# Patient Record
Sex: MALE | Race: WHITE
[De-identification: names, ages, dates, MRNs, and addresses within clinical notes are randomized per-mention and may not be internally consistent; named-entity substitution may affect disease eponyms.]

---

## 2020-04-12 ENCOUNTER — HOSPITAL ENCOUNTER (EMERGENCY)
Dept: HOSPITAL 88 - ER | Age: 50
Discharge: OTHER INSTITUTION | End: 2020-04-12
Payer: COMMERCIAL

## 2020-04-12 VITALS — SYSTOLIC BLOOD PRESSURE: 136 MMHG | DIASTOLIC BLOOD PRESSURE: 68 MMHG

## 2020-04-12 VITALS — WEIGHT: 315 LBS | HEIGHT: 72 IN | BODY MASS INDEX: 42.66 KG/M2

## 2020-04-12 DIAGNOSIS — E66.01: ICD-10-CM

## 2020-04-12 DIAGNOSIS — H53.131: Primary | ICD-10-CM

## 2020-04-12 LAB
ALBUMIN SERPL-MCNC: 3.4 G/DL (ref 3.5–5)
ALBUMIN/GLOB SERPL: 0.9 {RATIO} (ref 0.8–2)
ALP SERPL-CCNC: 77 IU/L (ref 40–150)
ALT SERPL-CCNC: 25 IU/L (ref 0–55)
ANION GAP SERPL CALC-SCNC: 11.4 MMOL/L (ref 8–16)
BASOPHILS # BLD AUTO: 0.1 10*3/UL (ref 0–0.1)
BASOPHILS NFR BLD AUTO: 0.5 % (ref 0–1)
BUN SERPL-MCNC: 12 MG/DL (ref 7–26)
BUN/CREAT SERPL: 11 (ref 6–25)
CALCIUM SERPL-MCNC: 8.8 MG/DL (ref 8.4–10.2)
CHLORIDE SERPL-SCNC: 109 MMOL/L (ref 98–107)
CO2 SERPL-SCNC: 25 MMOL/L (ref 22–29)
DEPRECATED NEUTROPHILS # BLD AUTO: 7.4 10*3/UL (ref 2.1–6.9)
EGFRCR SERPLBLD CKD-EPI 2021: > 60 ML/MIN (ref 60–?)
EOSINOPHIL # BLD AUTO: 0.1 10*3/UL (ref 0–0.4)
EOSINOPHIL NFR BLD AUTO: 0.6 % (ref 0–6)
ERYTHROCYTE [DISTWIDTH] IN CORD BLOOD: 14.4 % (ref 11.7–14.4)
GLOBULIN PLAS-MCNC: 3.7 G/DL (ref 2.3–3.5)
GLUCOSE SERPLBLD-MCNC: 122 MG/DL (ref 74–118)
HCT VFR BLD AUTO: 40.4 % (ref 38.2–49.6)
HGB BLD-MCNC: 13.1 G/DL (ref 14–18)
LYMPHOCYTES # BLD: 0.9 10*3/UL (ref 1–3.2)
LYMPHOCYTES NFR BLD AUTO: 10 % (ref 18–39.1)
MCH RBC QN AUTO: 29.4 PG (ref 28–32)
MCHC RBC AUTO-ENTMCNC: 32.4 G/DL (ref 31–35)
MCV RBC AUTO: 90.8 FL (ref 81–99)
MONOCYTES # BLD AUTO: 0.8 10*3/UL (ref 0.2–0.8)
MONOCYTES NFR BLD AUTO: 8.3 % (ref 4.4–11.3)
NEUTS SEG NFR BLD AUTO: 80.1 % (ref 38.7–80)
PLATELET # BLD AUTO: 193 X10E3/UL (ref 140–360)
POTASSIUM SERPL-SCNC: 4.4 MMOL/L (ref 3.5–5.1)
RBC # BLD AUTO: 4.45 X10E6/UL (ref 4.3–5.7)
SODIUM SERPL-SCNC: 141 MMOL/L (ref 136–145)

## 2020-04-12 PROCEDURE — 70496 CT ANGIOGRAPHY HEAD: CPT

## 2020-04-12 PROCEDURE — 85025 COMPLETE CBC W/AUTO DIFF WBC: CPT

## 2020-04-12 PROCEDURE — 36415 COLL VENOUS BLD VENIPUNCTURE: CPT

## 2020-04-12 PROCEDURE — 99284 EMERGENCY DEPT VISIT MOD MDM: CPT

## 2020-04-12 PROCEDURE — 80053 COMPREHEN METABOLIC PANEL: CPT

## 2020-04-12 PROCEDURE — 70498 CT ANGIOGRAPHY NECK: CPT

## 2020-04-12 NOTE — DIAGNOSTIC IMAGING REPORT
History:Vision loss, 

Comparison studies:None



Technique: 

Axial images were obtained from the thoracic inlet.

3-D reconstructions and maximum intensity projection reformats were performed.

Coronal and sagittal images reconstructed from the axial data.

Intravenous contrast: 100 cc of Omnipaque 300.



Dose modulation, iterative reconstruction, and/or weight based adjustment of

the mA/kV was utilized to reduce the radiation dose to as low as reasonably

achievable.



Findings:



Beam hardening artifact due to patient body habitus and suboptimal bolus timing

limits evaluation at the base of the neck.



Percentage of stenosis will be based on the NASCET criteria



Aortic arch and major vessels:

Patent. No abnormalities.



Common carotid arteries:

Patent. No abnormalities.



Right internal carotid artery:

Patent. No acute abnormalities. Retropharyngeal course.



Left internal carotid artery:

Patent. No abnormalities.



Right vertebral artery:

Patent. No abnormalities.



Left vertebral artery:

Patent. No abnormalities.



Basilar artery:

Patent. No abnormalities.



Posterior cerebral arteries:

Patent. No abnormalities.



Anatomical variants:

Acom: Patent .

Pcoms: Not visualized.

Vertebral arteries: Col-dominant



Degenerative moderate canal stenosis at C5-6 secondary to asymmetric right disc

osteophyte complex.



IMPRESSION:



Cervical CTA:

1.  No acute vascular abnormality



Intracranial CTA:

1.  No vascular abnormality



Signed by: DR Armin Jacinto M.D. on 4/12/2020 1:08 PM

## 2020-04-12 NOTE — XMS REPORT
Patient Summary Document

                             Created on: 2020



SHI LA

External Reference #: [id number]

: 1970

Sex: Male



Demographics







                          Address                   [address] [id number]

 

                          Home Phone                [phone]

 

                          Preferred Language        Unknown

 

                          Marital Status            Unknown

 

                          Ep[employer] Affiliation     Unknown

 

                          Race                      Unknown

 

                          Ethnic Group              Unknown





Author







                          Author                    [facility]

 

                          Address                   Unknown

 

                          Phone                     Unavailable







Care Team Providers







                    Care Team Member Name    Role                Phone

 

                    ANGELIQUE FIORE       Unavailable         Unavailable







Problems

This patient has no known problems.



Allergies, Adverse Reactions, Alerts

This patient has no known allergies or adverse reactions.



Medications

This patient has no known medications.



Results







           Test Description    Test Time    Test Comments    Text Results    Atomic Results    Result

 Comments

 

                CT BRAIN WO                                         [address] [address]      Patient Name: SHI LA   MR 
#: [id number]    : 1970 Age/Sex: 46/M  Acct #: [id number] Req #: [id number]
[id number]  Adm Physician:     Ordered by: TEODORO MORRISON  Report #: [id number] 
 Location: ER  Room/Bed:     
___________________________________________________________________________
________________________    Procedure: [id number] CT/CT BRAIN WO  Exam Date: 
10/12/17                            Exam Time: 1155       REPORT STATUS: Signed 
  Exam: Head CT without contrast   History:  Dizziness   Comparison studies:  
2008.      Technique:   Axial images were obtained from the skull base to 
the vertex.   Coronal and sagittal images reconstructed from the axial data.   
Intravenous contrast: None      Findings:      Scalp: No abnormalities.   Bones:
No fractures, blastic or lytic lesions.      Brain sulci: Appropriate for age.  
Ventricles: Normal in size. No hydrocephalus. Incidental punctate hyperdensity  
regional to the foramen of Monro may represent incidental choroidal   
calcification or small colloid cyst and is unchanged.      Extra-axial spaces: 
No masses, no fluid collection.       Parenchyma:    No abnormal densities.    
No masses, acute hemorrhage or acute or chronic cortical vascular insults.      
Sellar/suprasellar region: No abnormalities.   Craniocervical junction: Patent 
foramen magnum. No Chiari one malformation.         IMPRESSION:       1.  No 
acute abnormalities.   2.  No changes from the previous head CT of 2008.   
  Signed by: Dr. Royce Leija M.D. on 10/12/2017 12:53 PM        Dictated By: 
ROYCE LEIJA MD  Electronically Signed By: ROYCE LEIJA MD on 10/12/17 
1253  Transcribed By: NATASHA on 10/12/17 1253       COPY TO:   TEODORO MORRISON                                       

 

                CT CHEST W                                          [place] [address]      Patient Name: SHI LA   MR #:
[id number]    : 1970 Age/Sex: 46/M  Acct #: [id number] Req #: [id number]
[id number]  Adm Physician:     Ordered by: RAD FIORE MD  Report #: [id number]   
Location: ER  Room/Bed:     
_____________________________________________________________________________
______________________    Procedure: [id number] CT/CT CHEST W  Exam Date: 
10/12/17                            Exam Time: 1210       REPORT STATUS: Signed 
  PROCEDURE:   CT scan of the chest  WITH intravenous contrast, using PE 
protocol.       TECHNIQUE:   The chest was scanned utilizing a multidetector 
helical scanner from    the lung apex through the level of the adrenal glands 
after the IV    administration of 150 cc of Isovue 370 with special 
concentration of    the pulmonary arteries.  Coronal and sagittal multiplanar 
reformations    were obtained.       COMPARISON: None.       INDICATIONS: Shor
tness of breath          FINDINGS:   Lines/tubes:  None.       Lungs and 
Airways: Exam is limited by mildly suboptimal contrast bolus    timing.   No 
filling defects in the main, right or left pulmonary arteries to the    
segmental level to suggest pulmonary embolism.   No consolidation, pulmonary 
nodules, masses, or opacities.   Airways are clear, without endobronchial 
lesions.       Pleura: No effusion, or pneumothorax       Heart and mediastinum:
The heart is unremarkable. Heart size is    borderline enlarged. No pericardial 
effusion. Mild atherosclerotic    calcification of the coronary arteries, 
particularly the proximal LAD    (series 2, image 58). Aorta is non-aneurysmal. 
The main pulmonary    artery is normal in caliber.       Lymph nodes: No 
mediastinal, hilar, or axillary adenopathy.   Abdomen: Limited contrast-enhanced
views of the upper abdomen show no    abnormality within the visualized spleen, 
pancreas, or kidneys. Diffuse    hepatic steatosis. Punctate radiopaque 
densities in the dependent    portion of the gallbladder lumen likely represent 
small stones (series    2, image 127). The adrenal glands are unremarkable.     
 Bones: No acute bony abnormalities. No lytic lesions.       IMPRESSION:        
1. Mildly suboptimal contrast bolus timing, however, no CT evidence of    
pulmonary embolism to the segmental level, within the limitations of    the 
study.   2. Lungs are clear.   3. Hepatic steatosis.   4. Likely cholelithiasis,
without CT evidence of cholecystitis.                   Monica Gurrola M.D. 
   Dictated by:  Monica Gurrola M.D. on 10/12/2017 at 12:58        
Electronically approved by:  Monica Gurrola M.D. on 10/12/2017 at    12:59  
             Dictated By: MONICA GURROLA MD  Electronically Signed By: MONICA GURROLA MD on 10/12/17 1251  Transcribed By: LAILA on 10/12/17 1254       COPY 
TO:   RAD FIORE MD

## 2020-04-17 ENCOUNTER — HOSPITAL ENCOUNTER (INPATIENT)
Dept: HOSPITAL 88 - ER | Age: 50
LOS: 7 days | Discharge: HOME | DRG: 280 | End: 2020-04-24
Attending: INTERNAL MEDICINE | Admitting: INTERNAL MEDICINE
Payer: COMMERCIAL

## 2020-04-17 VITALS — HEIGHT: 71 IN | BODY MASS INDEX: 44.1 KG/M2 | WEIGHT: 315 LBS

## 2020-04-17 VITALS — DIASTOLIC BLOOD PRESSURE: 60 MMHG | SYSTOLIC BLOOD PRESSURE: 122 MMHG

## 2020-04-17 VITALS — SYSTOLIC BLOOD PRESSURE: 122 MMHG | DIASTOLIC BLOOD PRESSURE: 60 MMHG

## 2020-04-17 DIAGNOSIS — Z88.1: ICD-10-CM

## 2020-04-17 DIAGNOSIS — I10: ICD-10-CM

## 2020-04-17 DIAGNOSIS — Z88.0: ICD-10-CM

## 2020-04-17 DIAGNOSIS — I82.441: Primary | ICD-10-CM

## 2020-04-17 DIAGNOSIS — H81.10: ICD-10-CM

## 2020-04-17 DIAGNOSIS — I69.312: ICD-10-CM

## 2020-04-17 DIAGNOSIS — I21.A1: ICD-10-CM

## 2020-04-17 DIAGNOSIS — E66.01: ICD-10-CM

## 2020-04-17 DIAGNOSIS — Z88.8: ICD-10-CM

## 2020-04-17 DIAGNOSIS — I82.432: ICD-10-CM

## 2020-04-17 DIAGNOSIS — N17.9: ICD-10-CM

## 2020-04-17 DIAGNOSIS — J96.01: ICD-10-CM

## 2020-04-17 DIAGNOSIS — I26.09: ICD-10-CM

## 2020-04-17 DIAGNOSIS — E78.5: ICD-10-CM

## 2020-04-17 LAB
ALBUMIN SERPL-MCNC: 3.5 G/DL (ref 3.5–5)
ALBUMIN/GLOB SERPL: 0.8 {RATIO} (ref 0.8–2)
ALP SERPL-CCNC: 89 IU/L (ref 40–150)
ALT SERPL-CCNC: 29 IU/L (ref 0–55)
ANION GAP SERPL CALC-SCNC: 12.9 MMOL/L (ref 8–16)
BASOPHILS # BLD AUTO: 0 10*3/UL (ref 0–0.1)
BASOPHILS NFR BLD AUTO: 0.2 % (ref 0–1)
BUN SERPL-MCNC: 13 MG/DL (ref 7–26)
BUN/CREAT SERPL: 11 (ref 6–25)
CALCIUM SERPL-MCNC: 9.4 MG/DL (ref 8.4–10.2)
CHLORIDE SERPL-SCNC: 101 MMOL/L (ref 98–107)
CK MB SERPL-MCNC: 2.3 NG/ML (ref 0–5)
CK SERPL-CCNC: 86 IU/L (ref 30–200)
CO2 SERPL-SCNC: 27 MMOL/L (ref 22–29)
DEPRECATED APTT PLAS QN: 31.1 SECONDS (ref 23.8–35.5)
DEPRECATED INR PLAS: 1.01
DEPRECATED NEUTROPHILS # BLD AUTO: 13.8 10*3/UL (ref 2.1–6.9)
EGFRCR SERPLBLD CKD-EPI 2021: > 60 ML/MIN (ref 60–?)
EOSINOPHIL # BLD AUTO: 0.1 10*3/UL (ref 0–0.4)
EOSINOPHIL NFR BLD AUTO: 0.6 % (ref 0–6)
ERYTHROCYTE [DISTWIDTH] IN CORD BLOOD: 14.1 % (ref 11.7–14.4)
GLOBULIN PLAS-MCNC: 4.6 G/DL (ref 2.3–3.5)
GLUCOSE SERPLBLD-MCNC: 125 MG/DL (ref 74–118)
HCT VFR BLD AUTO: 42.5 % (ref 38.2–49.6)
HGB BLD-MCNC: 13.9 G/DL (ref 14–18)
LYMPHOCYTES # BLD: 1.2 10*3/UL (ref 1–3.2)
LYMPHOCYTES NFR BLD AUTO: 7 % (ref 18–39.1)
MCH RBC QN AUTO: 29.1 PG (ref 28–32)
MCHC RBC AUTO-ENTMCNC: 32.7 G/DL (ref 31–35)
MCV RBC AUTO: 89.1 FL (ref 81–99)
MONOCYTES # BLD AUTO: 1.7 10*3/UL (ref 0.2–0.8)
MONOCYTES NFR BLD AUTO: 10.1 % (ref 4.4–11.3)
NEUTS SEG NFR BLD AUTO: 81.6 % (ref 38.7–80)
PLATELET # BLD AUTO: 192 X10E3/UL (ref 140–360)
POTASSIUM SERPL-SCNC: 3.9 MMOL/L (ref 3.5–5.1)
PROTHROMBIN TIME: 13.9 SECONDS (ref 11.9–14.5)
RBC # BLD AUTO: 4.77 X10E6/UL (ref 4.3–5.7)
SODIUM SERPL-SCNC: 137 MMOL/L (ref 136–145)

## 2020-04-17 PROCEDURE — 93005 ELECTROCARDIOGRAM TRACING: CPT

## 2020-04-17 PROCEDURE — 84479 ASSAY OF THYROID (T3 OR T4): CPT

## 2020-04-17 PROCEDURE — 85610 PROTHROMBIN TIME: CPT

## 2020-04-17 PROCEDURE — 82550 ASSAY OF CK (CPK): CPT

## 2020-04-17 PROCEDURE — 82553 CREATINE MB FRACTION: CPT

## 2020-04-17 PROCEDURE — 85025 COMPLETE CBC W/AUTO DIFF WBC: CPT

## 2020-04-17 PROCEDURE — 81001 URINALYSIS AUTO W/SCOPE: CPT

## 2020-04-17 PROCEDURE — 87040 BLOOD CULTURE FOR BACTERIA: CPT

## 2020-04-17 PROCEDURE — 99284 EMERGENCY DEPT VISIT MOD MDM: CPT

## 2020-04-17 PROCEDURE — 71260 CT THORAX DX C+: CPT

## 2020-04-17 PROCEDURE — 36415 COLL VENOUS BLD VENIPUNCTURE: CPT

## 2020-04-17 PROCEDURE — 80048 BASIC METABOLIC PNL TOTAL CA: CPT

## 2020-04-17 PROCEDURE — 71046 X-RAY EXAM CHEST 2 VIEWS: CPT

## 2020-04-17 PROCEDURE — 80053 COMPREHEN METABOLIC PANEL: CPT

## 2020-04-17 PROCEDURE — 83880 ASSAY OF NATRIURETIC PEPTIDE: CPT

## 2020-04-17 PROCEDURE — 83605 ASSAY OF LACTIC ACID: CPT

## 2020-04-17 PROCEDURE — 86022 PLATELET ANTIBODIES: CPT

## 2020-04-17 PROCEDURE — 93306 TTE W/DOPPLER COMPLETE: CPT

## 2020-04-17 PROCEDURE — 84443 ASSAY THYROID STIM HORMONE: CPT

## 2020-04-17 PROCEDURE — 85384 FIBRINOGEN ACTIVITY: CPT

## 2020-04-17 PROCEDURE — 80061 LIPID PANEL: CPT

## 2020-04-17 PROCEDURE — 85730 THROMBOPLASTIN TIME PARTIAL: CPT

## 2020-04-17 PROCEDURE — 84484 ASSAY OF TROPONIN QUANT: CPT

## 2020-04-17 PROCEDURE — 93970 EXTREMITY STUDY: CPT

## 2020-04-17 PROCEDURE — 85379 FIBRIN DEGRADATION QUANT: CPT

## 2020-04-17 PROCEDURE — 94640 AIRWAY INHALATION TREATMENT: CPT

## 2020-04-17 PROCEDURE — 84436 ASSAY OF TOTAL THYROXINE: CPT

## 2020-04-17 RX ADMIN — Medication SCH MLS/HR: at 22:09

## 2020-04-17 NOTE — DIAGNOSTIC IMAGING REPORT
EXAM: CT Chest WITH contrast (PE protocol) 4/17/2020 9:02 PM

INDICATION: Short of breath  

COMPARISON: Same day chest x-ray 



TECHNIQUE:

Chest was scanned utilizing a multidetector helical scanner from the lung apex

through the level of the adrenal glands with administration of IV contrast.

Coronal and sagittal reformations were obtained. Pulmonary embolism protocol

was performed with special attention on the pulmonary arteries.

     IV CONTRAST: 100 mL of Isovue 370



COMPLICATIONS: None



RADIATION DOSE: 

     Total DLP: 547 mGy*cm

     Estimated effective dose: (DLP x 0.014 x size factor) mSv

     CTDIvol has been reviewed. It is below the limits set by the Radiation

Protocol Committee (RPC).

     Dose modulation, iterative reconstruction, and/or weight based adjustment

of the mA/kV was utilized to reduce the radiation dose to as low as reasonably

achievable. 



FINDINGS:



LINES/ TUBES: None.



LUNGS AND AIRWAYS:  Filling defects within the right main and bilateral lobar

and segmental pulmonary arteries. Groundglass haziness in the posterobasilar

right lower lobe.  Airways are normal.



PLEURA: The pleural spaces are clear.



HEART AND MEDIASTINUM: The thyroid gland is normal.  No mediastinal, hilar or

axillary lymphadenopathy.  The heart is mildly enlarged. There is no

pericardial effusion. Left anterior descending coronary calcifications.    



UPPER ABDOMEN: The liver is enlarged and hypodense.



BONES: There are degenerative changes in the thoracic spine.



SOFT TISSUES: Unremarkable.



IMPRESSION: 

   

Shower pulmonary emboli including in the right main pulmonary artery and

bilateral lobar and segmental pulmonary arteries. Groundglass opacity in the

posterobasilar right lower lobe is likely pulmonary infarct. These findings

were discussed with Dr. You at 9:39 PM on 4/17/2020 by Dr. Engel via

telephone.



Mild cardiomegaly and coronary artery calcific atherosclerosis.



Hepatomegaly with hepatic steatosis.







Signed by: Marcos Engel DO on 4/17/2020 9:44 PM

## 2020-04-17 NOTE — DIAGNOSTIC IMAGING REPORT
EXAMINATION:  CHEST 2 VIEWS    



INDICATION: Back pain, hurts to breathe  



COMPARISON:  None

     

FINDINGS:  



TUBES and LINES:  None.



LUNGS:  Normal lung volumes.  Lungs are clear.  No consolidations.



PLEURA:  No pleural effusion or pneumothorax.



HEART AND MEDIASTINUM:  The cardiomediastinal silhouette is unremarkable.    



BONES AND SOFT TISSUES:  No acute osseous lesion.  Soft tissues are

unremarkable.



UPPER ABDOMEN: No free air under the diaphragm.    



IMPRESSION:

 

No acute thoracic radiographic abnormality.





Signed by: Marcos Engel DO on 4/17/2020 9:20 PM

## 2020-04-18 VITALS — DIASTOLIC BLOOD PRESSURE: 76 MMHG | SYSTOLIC BLOOD PRESSURE: 159 MMHG

## 2020-04-18 VITALS — SYSTOLIC BLOOD PRESSURE: 131 MMHG | DIASTOLIC BLOOD PRESSURE: 71 MMHG

## 2020-04-18 VITALS — DIASTOLIC BLOOD PRESSURE: 69 MMHG | SYSTOLIC BLOOD PRESSURE: 136 MMHG

## 2020-04-18 VITALS — DIASTOLIC BLOOD PRESSURE: 73 MMHG | SYSTOLIC BLOOD PRESSURE: 155 MMHG

## 2020-04-18 VITALS — SYSTOLIC BLOOD PRESSURE: 141 MMHG | DIASTOLIC BLOOD PRESSURE: 69 MMHG

## 2020-04-18 VITALS — DIASTOLIC BLOOD PRESSURE: 69 MMHG | SYSTOLIC BLOOD PRESSURE: 141 MMHG

## 2020-04-18 VITALS — SYSTOLIC BLOOD PRESSURE: 159 MMHG | DIASTOLIC BLOOD PRESSURE: 76 MMHG

## 2020-04-18 LAB
ALBUMIN SERPL-MCNC: 3.1 G/DL (ref 3.5–5)
ALBUMIN/GLOB SERPL: 0.8 {RATIO} (ref 0.8–2)
ALP SERPL-CCNC: 77 IU/L (ref 40–150)
ALT SERPL-CCNC: 26 IU/L (ref 0–55)
ANION GAP SERPL CALC-SCNC: 8.8 MMOL/L (ref 8–16)
BACTERIA URNS QL MICRO: (no result) /HPF
BASOPHILS # BLD AUTO: 0.1 10*3/UL (ref 0–0.1)
BASOPHILS NFR BLD AUTO: 0.4 % (ref 0–1)
BILIRUB UR QL: NEGATIVE
BUN SERPL-MCNC: 16 MG/DL (ref 7–26)
BUN/CREAT SERPL: 13 (ref 6–25)
CALCIUM SERPL-MCNC: 9 MG/DL (ref 8.4–10.2)
CHLORIDE SERPL-SCNC: 104 MMOL/L (ref 98–107)
CK MB SERPL-MCNC: 1.9 NG/ML (ref 0–5)
CK SERPL-CCNC: 67 IU/L (ref 30–200)
CLARITY UR: (no result)
CO2 SERPL-SCNC: 26 MMOL/L (ref 22–29)
COLOR UR: (no result)
DEPRECATED NEUTROPHILS # BLD AUTO: 10.2 10*3/UL (ref 2.1–6.9)
DEPRECATED RBC URNS MANUAL-ACNC: (no result) /HPF (ref 0–5)
EGFRCR SERPLBLD CKD-EPI 2021: > 60 ML/MIN (ref 60–?)
EOSINOPHIL # BLD AUTO: 0.2 10*3/UL (ref 0–0.4)
EOSINOPHIL NFR BLD AUTO: 1.7 % (ref 0–6)
EPI CELLS URNS QL MICRO: (no result) /LPF
ERYTHROCYTE [DISTWIDTH] IN CORD BLOOD: 14.4 % (ref 11.7–14.4)
GLOBULIN PLAS-MCNC: 4 G/DL (ref 2.3–3.5)
GLUCOSE SERPLBLD-MCNC: 113 MG/DL (ref 74–118)
HCT VFR BLD AUTO: 38 % (ref 38.2–49.6)
HGB BLD-MCNC: 12.4 G/DL (ref 14–18)
KETONES UR QL STRIP.AUTO: NEGATIVE
LEUKOCYTE ESTERASE UR QL STRIP.AUTO: NEGATIVE
LYMPHOCYTES # BLD: 1.7 10*3/UL (ref 1–3.2)
LYMPHOCYTES NFR BLD AUTO: 12.4 % (ref 18–39.1)
MCH RBC QN AUTO: 29.5 PG (ref 28–32)
MCHC RBC AUTO-ENTMCNC: 32.6 G/DL (ref 31–35)
MCV RBC AUTO: 90.3 FL (ref 81–99)
MONOCYTES # BLD AUTO: 1.4 10*3/UL (ref 0.2–0.8)
MONOCYTES NFR BLD AUTO: 10.4 % (ref 4.4–11.3)
NEUTS SEG NFR BLD AUTO: 74.7 % (ref 38.7–80)
NITRITE UR QL STRIP.AUTO: NEGATIVE
PLATELET # BLD AUTO: 160 X10E3/UL (ref 140–360)
POTASSIUM SERPL-SCNC: 3.8 MMOL/L (ref 3.5–5.1)
PROT UR QL STRIP.AUTO: (no result)
RBC # BLD AUTO: 4.21 X10E6/UL (ref 4.3–5.7)
SODIUM SERPL-SCNC: 135 MMOL/L (ref 136–145)
SP GR UR STRIP: 1.02 (ref 1.01–1.02)
UROBILINOGEN UR STRIP-MCNC: 0.2 MG/DL (ref 0.2–1)
WBC #/AREA URNS HPF: (no result) /HPF (ref 0–5)

## 2020-04-18 RX ADMIN — WARFARIN SODIUM SCH MG: 5 TABLET ORAL at 16:41

## 2020-04-18 RX ADMIN — HYDROCODONE BITARTRATE AND ACETAMINOPHEN PRN EA: 10; 325 TABLET ORAL at 21:44

## 2020-04-18 RX ADMIN — Medication SCH MLS/HR: at 13:31

## 2020-04-19 VITALS — DIASTOLIC BLOOD PRESSURE: 73 MMHG | SYSTOLIC BLOOD PRESSURE: 141 MMHG

## 2020-04-19 VITALS — DIASTOLIC BLOOD PRESSURE: 68 MMHG | SYSTOLIC BLOOD PRESSURE: 140 MMHG

## 2020-04-19 VITALS — DIASTOLIC BLOOD PRESSURE: 72 MMHG | SYSTOLIC BLOOD PRESSURE: 141 MMHG

## 2020-04-19 VITALS — SYSTOLIC BLOOD PRESSURE: 146 MMHG | DIASTOLIC BLOOD PRESSURE: 69 MMHG

## 2020-04-19 VITALS — SYSTOLIC BLOOD PRESSURE: 137 MMHG | DIASTOLIC BLOOD PRESSURE: 73 MMHG

## 2020-04-19 VITALS — DIASTOLIC BLOOD PRESSURE: 79 MMHG | SYSTOLIC BLOOD PRESSURE: 163 MMHG

## 2020-04-19 LAB
BASOPHILS # BLD AUTO: 0 10*3/UL (ref 0–0.1)
BASOPHILS NFR BLD AUTO: 0.3 % (ref 0–1)
CHOLEST SERPL-MCNC: 96 MD/DL (ref 0–199)
CHOLEST/HDLC SERPL: 2.5 {RATIO} (ref 3.9–4.7)
DEPRECATED NEUTROPHILS # BLD AUTO: 9.8 10*3/UL (ref 2.1–6.9)
EOSINOPHIL # BLD AUTO: 0.2 10*3/UL (ref 0–0.4)
EOSINOPHIL NFR BLD AUTO: 1.2 % (ref 0–6)
ERYTHROCYTE [DISTWIDTH] IN CORD BLOOD: 14.4 % (ref 11.7–14.4)
HCT VFR BLD AUTO: 38.1 % (ref 38.2–49.6)
HDLC SERPL-MSCNC: 38 MG/DL (ref 40–60)
HGB BLD-MCNC: 12.5 G/DL (ref 14–18)
LDLC SERPL CALC-MCNC: 44 MG/DL (ref 60–130)
LYMPHOCYTES # BLD: 1.2 10*3/UL (ref 1–3.2)
LYMPHOCYTES NFR BLD AUTO: 9.4 % (ref 18–39.1)
MCH RBC QN AUTO: 29.8 PG (ref 28–32)
MCHC RBC AUTO-ENTMCNC: 32.8 G/DL (ref 31–35)
MCV RBC AUTO: 90.7 FL (ref 81–99)
MONOCYTES # BLD AUTO: 1.7 10*3/UL (ref 0.2–0.8)
MONOCYTES NFR BLD AUTO: 13.3 % (ref 4.4–11.3)
NEUTS SEG NFR BLD AUTO: 75.2 % (ref 38.7–80)
PLATELET # BLD AUTO: 167 X10E3/UL (ref 140–360)
RBC # BLD AUTO: 4.2 X10E6/UL (ref 4.3–5.7)
TRIGL SERPL-MCNC: 70 MG/DL (ref 0–149)

## 2020-04-19 RX ADMIN — HYDROCODONE BITARTRATE AND ACETAMINOPHEN PRN EA: 10; 325 TABLET ORAL at 08:43

## 2020-04-19 RX ADMIN — VALSARTAN SCH MG: 80 TABLET ORAL at 17:28

## 2020-04-19 RX ADMIN — ATORVASTATIN CALCIUM SCH MG: 20 TABLET, FILM COATED ORAL at 20:41

## 2020-04-19 RX ADMIN — HYDROCODONE BITARTRATE AND ACETAMINOPHEN PRN EA: 10; 325 TABLET ORAL at 13:36

## 2020-04-19 RX ADMIN — WARFARIN SODIUM SCH MG: 5 TABLET ORAL at 17:28

## 2020-04-19 RX ADMIN — Medication SCH MLS/HR: at 07:05

## 2020-04-19 RX ADMIN — HYDROCODONE BITARTRATE AND ACETAMINOPHEN PRN EA: 10; 325 TABLET ORAL at 17:35

## 2020-04-19 RX ADMIN — HYDROCODONE BITARTRATE AND ACETAMINOPHEN PRN EA: 10; 325 TABLET ORAL at 21:55

## 2020-04-20 VITALS — SYSTOLIC BLOOD PRESSURE: 136 MMHG | DIASTOLIC BLOOD PRESSURE: 72 MMHG

## 2020-04-20 VITALS — SYSTOLIC BLOOD PRESSURE: 140 MMHG | DIASTOLIC BLOOD PRESSURE: 77 MMHG

## 2020-04-20 VITALS — DIASTOLIC BLOOD PRESSURE: 72 MMHG | SYSTOLIC BLOOD PRESSURE: 136 MMHG

## 2020-04-20 VITALS — DIASTOLIC BLOOD PRESSURE: 77 MMHG | SYSTOLIC BLOOD PRESSURE: 117 MMHG

## 2020-04-20 VITALS — SYSTOLIC BLOOD PRESSURE: 94 MMHG | DIASTOLIC BLOOD PRESSURE: 68 MMHG

## 2020-04-20 VITALS — SYSTOLIC BLOOD PRESSURE: 118 MMHG | DIASTOLIC BLOOD PRESSURE: 67 MMHG

## 2020-04-20 VITALS — DIASTOLIC BLOOD PRESSURE: 67 MMHG | SYSTOLIC BLOOD PRESSURE: 118 MMHG

## 2020-04-20 VITALS — SYSTOLIC BLOOD PRESSURE: 125 MMHG | DIASTOLIC BLOOD PRESSURE: 58 MMHG

## 2020-04-20 VITALS — DIASTOLIC BLOOD PRESSURE: 75 MMHG | SYSTOLIC BLOOD PRESSURE: 126 MMHG

## 2020-04-20 LAB
ANION GAP SERPL CALC-SCNC: 12.2 MMOL/L (ref 8–16)
BASOPHILS # BLD AUTO: 0.1 10*3/UL (ref 0–0.1)
BASOPHILS NFR BLD AUTO: 0.3 % (ref 0–1)
BUN SERPL-MCNC: 19 MG/DL (ref 7–26)
BUN/CREAT SERPL: 12 (ref 6–25)
CALCIUM SERPL-MCNC: 9 MG/DL (ref 8.4–10.2)
CHLORIDE SERPL-SCNC: 103 MMOL/L (ref 98–107)
CO2 SERPL-SCNC: 23 MMOL/L (ref 22–29)
DEPRECATED APTT PLAS QN: 81.2 SECONDS (ref 23.8–35.5)
DEPRECATED INR PLAS: 1.22
DEPRECATED INR PLAS: 1.38
DEPRECATED NEUTROPHILS # BLD AUTO: 15.8 10*3/UL (ref 2.1–6.9)
EGFRCR SERPLBLD CKD-EPI 2021: 48 ML/MIN (ref 60–?)
EOSINOPHIL # BLD AUTO: 0 10*3/UL (ref 0–0.4)
EOSINOPHIL NFR BLD AUTO: 0.1 % (ref 0–6)
ERYTHROCYTE [DISTWIDTH] IN CORD BLOOD: 14.5 % (ref 11.7–14.4)
GLUCOSE SERPLBLD-MCNC: 141 MG/DL (ref 74–118)
HCT VFR BLD AUTO: 37.8 % (ref 38.2–49.6)
HGB BLD-MCNC: 12.7 G/DL (ref 14–18)
LYMPHOCYTES # BLD: 1.5 10*3/UL (ref 1–3.2)
LYMPHOCYTES NFR BLD AUTO: 7.5 % (ref 18–39.1)
MCH RBC QN AUTO: 29.7 PG (ref 28–32)
MCHC RBC AUTO-ENTMCNC: 33.6 G/DL (ref 31–35)
MCV RBC AUTO: 88.5 FL (ref 81–99)
MONOCYTES # BLD AUTO: 2.2 10*3/UL (ref 0.2–0.8)
MONOCYTES NFR BLD AUTO: 11.1 % (ref 4.4–11.3)
NEUTS SEG NFR BLD AUTO: 80 % (ref 38.7–80)
PLATELET # BLD AUTO: 147 X10E3/UL (ref 140–360)
POTASSIUM SERPL-SCNC: 4.2 MMOL/L (ref 3.5–5.1)
PROTHROMBIN TIME: 16.2 SECONDS (ref 11.9–14.5)
PROTHROMBIN TIME: 17.9 SECONDS (ref 11.9–14.5)
RBC # BLD AUTO: 4.27 X10E6/UL (ref 4.3–5.7)
SODIUM SERPL-SCNC: 134 MMOL/L (ref 136–145)

## 2020-04-20 RX ADMIN — VALSARTAN SCH MG: 80 TABLET ORAL at 09:26

## 2020-04-20 RX ADMIN — Medication SCH ML: at 23:25

## 2020-04-20 RX ADMIN — Medication SCH MLS/HR: at 00:55

## 2020-04-20 RX ADMIN — DOCUSATE SODIUM SCH MG: 100 TABLET, FILM COATED ORAL at 17:54

## 2020-04-20 RX ADMIN — METOPROLOL TARTRATE SCH MG: 25 TABLET, FILM COATED ORAL at 18:14

## 2020-04-20 RX ADMIN — ATORVASTATIN CALCIUM SCH MG: 20 TABLET, FILM COATED ORAL at 20:55

## 2020-04-20 RX ADMIN — DOCUSATE SODIUM SCH MG: 100 TABLET, FILM COATED ORAL at 10:23

## 2020-04-20 RX ADMIN — Medication SCH MLS/HR: at 21:45

## 2020-04-20 NOTE — DIAGNOSTIC IMAGING REPORT
EXAMINATION:  CHEST 2 VIEWS    



INDICATION: SOB/PE/PULMONARY INFARCT



COMPARISON:  CT Chest 4/17/2020 and Chest radiograph 4/17/2020. 

     

FINDINGS:

TUBES and LINES:  None.



LUNGS: Lungs are moderately inflated. Central vascular crowding. Patchy basilar

opacity in the right lung. No new consolidation. 



PLEURA:  No pleural effusion or pneumothorax. 



HEART AND MEDIASTINUM:  The cardiomediastinal silhouette is unremarkable.    



BONES AND SOFT TISSUES:  No acute osseous lesion.  Soft tissues are

unremarkable.



UPPER ABDOMEN: No free air under the diaphragm.    



IMPRESSION: 

Patchy right basilar opacity, corresponding to pulmonary infarct noted on prior

CT. No new consolidation.



Signed by: Dr. Carlos Payne MD on 4/20/2020 9:32 PM

## 2020-04-21 VITALS — DIASTOLIC BLOOD PRESSURE: 67 MMHG | SYSTOLIC BLOOD PRESSURE: 123 MMHG

## 2020-04-21 VITALS — DIASTOLIC BLOOD PRESSURE: 72 MMHG | SYSTOLIC BLOOD PRESSURE: 113 MMHG

## 2020-04-21 VITALS — DIASTOLIC BLOOD PRESSURE: 78 MMHG | SYSTOLIC BLOOD PRESSURE: 102 MMHG

## 2020-04-21 VITALS — SYSTOLIC BLOOD PRESSURE: 128 MMHG | DIASTOLIC BLOOD PRESSURE: 77 MMHG

## 2020-04-21 VITALS — DIASTOLIC BLOOD PRESSURE: 68 MMHG | SYSTOLIC BLOOD PRESSURE: 126 MMHG

## 2020-04-21 LAB
ANION GAP SERPL CALC-SCNC: 11.1 MMOL/L (ref 8–16)
ANISOCYTOSIS BLD QL SMEAR: SLIGHT
BASOPHILS # BLD AUTO: 0.1 10*3/UL (ref 0–0.1)
BASOPHILS # BLD AUTO: 0.1 10*3/UL (ref 0–0.1)
BASOPHILS NFR BLD AUTO: 0.3 % (ref 0–1)
BASOPHILS NFR BLD AUTO: 0.3 % (ref 0–1)
BUN SERPL-MCNC: 22 MG/DL (ref 7–26)
BUN/CREAT SERPL: 14 (ref 6–25)
CALCIUM SERPL-MCNC: 9 MG/DL (ref 8.4–10.2)
CHLORIDE SERPL-SCNC: 102 MMOL/L (ref 98–107)
CO2 SERPL-SCNC: 27 MMOL/L (ref 22–29)
DEPRECATED APTT PLAS QN: 85.9 SECONDS (ref 23.8–35.5)
DEPRECATED INR PLAS: 1.55
DEPRECATED NEUTROPHILS # BLD AUTO: 12.8 10*3/UL (ref 2.1–6.9)
DEPRECATED NEUTROPHILS # BLD AUTO: 14.7 10*3/UL (ref 2.1–6.9)
EGFRCR SERPLBLD CKD-EPI 2021: 45 ML/MIN (ref 60–?)
EOSINOPHIL # BLD AUTO: 0 10*3/UL (ref 0–0.4)
EOSINOPHIL # BLD AUTO: 0.1 10*3/UL (ref 0–0.4)
EOSINOPHIL NFR BLD AUTO: 0.2 % (ref 0–6)
EOSINOPHIL NFR BLD AUTO: 0.3 % (ref 0–6)
EOSINOPHIL NFR BLD MANUAL: 2 % (ref 0–7)
ERYTHROCYTE [DISTWIDTH] IN CORD BLOOD: 14.6 % (ref 11.7–14.4)
ERYTHROCYTE [DISTWIDTH] IN CORD BLOOD: 14.7 % (ref 11.7–14.4)
GLUCOSE SERPLBLD-MCNC: 122 MG/DL (ref 74–118)
HCT VFR BLD AUTO: 37.9 % (ref 38.2–49.6)
HCT VFR BLD AUTO: 38.1 % (ref 38.2–49.6)
HGB BLD-MCNC: 12.6 G/DL (ref 14–18)
HGB BLD-MCNC: 12.7 G/DL (ref 14–18)
LYMPHOCYTES # BLD: 1.6 10*3/UL (ref 1–3.2)
LYMPHOCYTES # BLD: 1.9 10*3/UL (ref 1–3.2)
LYMPHOCYTES NFR BLD AUTO: 11.1 % (ref 18–39.1)
LYMPHOCYTES NFR BLD AUTO: 8.5 % (ref 18–39.1)
LYMPHOCYTES NFR BLD MANUAL: 6 % (ref 19–48)
MCH RBC QN AUTO: 29.6 PG (ref 28–32)
MCH RBC QN AUTO: 30 PG (ref 28–32)
MCHC RBC AUTO-ENTMCNC: 33.2 G/DL (ref 31–35)
MCHC RBC AUTO-ENTMCNC: 33.3 G/DL (ref 31–35)
MCV RBC AUTO: 88.8 FL (ref 81–99)
MCV RBC AUTO: 90.2 FL (ref 81–99)
MONOCYTES # BLD AUTO: 1.9 10*3/UL (ref 0.2–0.8)
MONOCYTES # BLD AUTO: 2.1 10*3/UL (ref 0.2–0.8)
MONOCYTES NFR BLD AUTO: 11.3 % (ref 4.4–11.3)
MONOCYTES NFR BLD AUTO: 11.3 % (ref 4.4–11.3)
MONOCYTES NFR BLD MANUAL: 6 % (ref 3.4–9)
NEUTS SEG NFR BLD AUTO: 76.1 % (ref 38.7–80)
NEUTS SEG NFR BLD AUTO: 78.7 % (ref 38.7–80)
NEUTS SEG NFR BLD MANUAL: 86 % (ref 40–74)
PLAT MORPH BLD: NORMAL
PLATELET # BLD AUTO: 132 X10E3/UL (ref 140–360)
PLATELET # BLD AUTO: 150 X10E3/UL (ref 140–360)
PLATELET # BLD EST: ADEQUATE 10*3/UL
POTASSIUM SERPL-SCNC: 4.1 MMOL/L (ref 3.5–5.1)
PROTHROMBIN TIME: 19.6 SECONDS (ref 11.9–14.5)
RBC # BLD AUTO: 4.2 X10E6/UL (ref 4.3–5.7)
RBC # BLD AUTO: 4.29 X10E6/UL (ref 4.3–5.7)
RBC MORPH BLD: NORMAL
SODIUM SERPL-SCNC: 136 MMOL/L (ref 136–145)

## 2020-04-21 RX ADMIN — METOPROLOL TARTRATE SCH MG: 25 TABLET, FILM COATED ORAL at 08:14

## 2020-04-21 RX ADMIN — DOCUSATE SODIUM SCH MG: 100 TABLET, FILM COATED ORAL at 16:57

## 2020-04-21 RX ADMIN — DOCUSATE SODIUM SCH MG: 100 TABLET, FILM COATED ORAL at 08:13

## 2020-04-21 RX ADMIN — Medication SCH ML: at 15:51

## 2020-04-21 RX ADMIN — Medication SCH MLS/HR: at 21:45

## 2020-04-21 RX ADMIN — ASPIRIN 81 MG CHEWABLE TABLET SCH MG: 81 TABLET CHEWABLE at 08:13

## 2020-04-21 RX ADMIN — METOPROLOL TARTRATE SCH MG: 25 TABLET, FILM COATED ORAL at 16:59

## 2020-04-21 RX ADMIN — Medication SCH ML: at 03:20

## 2020-04-21 RX ADMIN — Medication SCH ML: at 06:51

## 2020-04-21 RX ADMIN — ATORVASTATIN CALCIUM SCH MG: 20 TABLET, FILM COATED ORAL at 21:43

## 2020-04-21 RX ADMIN — Medication SCH ML: at 10:44

## 2020-04-21 NOTE — CONSULTATION
DATE OF CONSULTATION:  

 

Cardiac Consultation 

 

REASON FOR CONSULTATION:  Elevated troponin.

 

HISTORY OF PRESENT ILLNESS:  This is a 49-year-old gentleman, who was in his usual

status of health still [facility] on  or so.  The patient came to this

institution with loss of peripheral vision of the left eye.  He was sent over to

[facility].  As per the patient, he got CT, MRI, echo, carotid, etc., and they

were all negative.  The patient was dismissed home with a diagnosis of

hypercholesterolemia, hypertension, and morbid obesity.  Of note, the patient does have

morbid obesity and sleep apnea.  He was dismissed home on aspirin, Plavix, Lipitor,

nifedipine 60 mg a day, and Diovan 80 mg a day.  He was dismissed home for the patient

to be presented to this institution on the .  At that time, he was having definitely

pleuritic chest pain and shortness of breath.  CT scan revealed the presence of shower

pulmonary emboli in the right main pulmonary artery and bilateral lobe and segmental

pulmonary artery.  The patient was seen and evaluated by Dr. Malagon and Pulmonary

consultation was obtained.  The patient is started on heparin and warfarin.  His

troponin was elevated at 1.3, so cardiac consultation is obtained.  I visited with the

patient, who is having shortness of breath and pleuritic chest pain.  He is not in any

respiratory distress.  He denied having any anginal symptoms before.  He is morbidly

obese and known to be on CPAP.  The diagnosis of hypertension and hyperlipidemia are new

to him and the medication just started recently.  His peripheral vision is better. 

 

The patient really spend long time in bed.  He does not ambulate.  With this acute

illness, he was in bed all the time.  So this could be the need and the cause for his

pulmonary emboli.  There are no prior history of embolism or hypercoagulability.  The

patient works as  in [employer].  His activity is usually limited.  He

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  He is physically inactive.  He

does have easy fatigability and shortness of breath, which is going on for some time

till the acute illness was worsened and he had pleuritic chest pain. 

 

REVIEW OF SYSTEMS:

GENERAL:  No fever.  No chills.  No weight loss.  No weight gain. 

HEENT:  Acute illness recently of loss of right peripheral vision. 

PULMONARY AND CARDIAC:  As per acute illness. 

GI:  No hematemesis.  No melena.  No nausea.  No vomiting. 

:  No hematuria.  No dysuria. 

MUSCULOSKELETAL:  Occasional back pain, knee pain. 

NEUROLOGICAL:  Only the eye problem.  No localized deficit. 

ENDOCRINE:  No history of diabetes mellitus.  No polyuria.  No polydipsia. 

HEMATOLOGY:  No prior coagulopathy.  No bleeding. 

SKIN:  No rashes.  Connective tissue, no symptoms to suggest connective tissue disease.

 

SOCIAL HISTORY:  The patient is a city .  He is single.  He is nonsmoker and

non-alcohol drinker. 

 

HOME MEDICATIONS:  Just recently started aspirin, Plavix, Lipitor, nifedipine 60 mg a

day, and Diovan 80 mg a day. 

 

ALLERGIES:  CIPRO, TETRACYCLINE, PENICILLIN.

 

PAST MEDICAL HISTORY:  

1. Morbid obesity, on CPAP.

2. Hypertension and hyperlipidemia, recently diagnosed.

3. Yesterday diagnosed with CVA.  Workup is negative including extensive workup.

 

FAMILY HISTORY:  Father  of homicide in his 40s.  Mother  of overdose.  No

brother.  Only one half-sister, who got [place] disease.  No children. 

 

PHYSICAL EXAMINATION:

GENERAL:  Morbidly obese, in no acute distress. 

VITAL SIGNS:  Height of 5 feet 11 inches, weight of 418 pounds.  Blood pressure 120/70,

heart rate of 90, respiratory rate of 18, and temperature of 96 Fahrenheit. 

HEENT:  Pupils are reactive. 

NECK:  No elevation of jugular venous pulsation.  No bruit. 

CHEST:  Clear to auscultation and percussion. 

HEART:  Distant heart sounds.  Unable to palpate the heart. 

ABDOMEN:  Obese.  No organomegaly. 

EXTREMITIES:  No cyanosis.  No clubbing.  No edema. 

NEUROLOGIC:  Awake, alert, and oriented.  Able to move all extremities.

LABORATORY DATA:  BUN of 19 and creatinine of 1.55.  White blood cell count of 16.8,

hemoglobin of 12.6, hematocrit 37%, and platelet count of 130,000.  Triglycerides of 70,

cholesterol of 96, HDL of 38, and LDL of 49.  BNP of 24.  Troponin of 1.3.  EKG; normal

sinus rhythm, nonspecific EKG changes.  BNP of only 24.  CT chest showing pulmonary

emboli as described above. 

 

IMPRESSION AND PLAN:  

1. Acute pulmonary embolism in patient nonambulatory.  Agree with the treatment with

anticoagulation. 

2. We will do venous Doppler to look for a source.

3. Morbid obesity.

4. Recent cerebrovascular accident, questionable etiology.

5. The patient's current blood pressure is very good on no medication.

6. Hyperlipidemia, on medication.  Cholesterol level is good.

 

RECOMMENDATIONS:  Continuation of current medication.  The patient informed in the

future he should have DEIDRA for the completeness of the workup and we will leave it up to

his hematologist in the future if you want to do thrombophilia profile, although the

presentation are more consistent with his morbid obesity and decreased activity as a

cause for his presentation.  Regarding his troponin is most likely secondary to PE.

Again in the future, probably a stress test will be beneficial, but for the time being,

we will continue medical therapy.  Questions are answered.  The patient's condition is

explained.  The patient attended and it was long visit explaining and answering his

questions and calming his fears.  Of course, DEIDRA needs to be done because of unknown

cause of his CVA and possible his CVA is secondary to DVT and embolic phenomena. 

 

 

 

 

______________________________

MD LORENA Siegel/JEMAL

D:  2020 10:31:45

T:  2020 11:12:37

Job #:  [id number]

## 2020-04-21 NOTE — CONSULTATION
DATE OF CONSULTATION:  

 

Pulmonary Consultation. 

 

HISTORY OF PRESENT ILLNESS:  Mr. Retana is a 49-year-old white man with a history of

morbid obesity, he is 418 pounds, his BMI is 58, and obstructive sleep apnea up until

around [place].  Did not have any other significant medical problems.  He presented

around [facility] with right peripheral vision field impairment and was transferred from

here to [facility] at [facility] for evaluation.  He had testing including

cardiac CT, telemetry, other imaging.  There was maybe thought that he had paroxysmal

atrial fibrillation and was ultimately discharged with aspirin, Plavix, Lipitor,

nifedipine, and Diovan.  About 2 days after discharge, the patient began to have

right-sided what he thought was back pain that was pleuritic in nature.  He had some

associated shortness of breath.  Although he does state that the shortness of breath

began around the time of discharge from [facility], in that he just felt fatigued and

somewhat dyspneic and thought was due to him being lying around and hospitalized.

Because it got worse, he re-presented to the emergency room.  He was evaluated, imaged

and he had a CT of the chest that I reviewed that showed bilateral filling defects,

right greater than left, consistent with pulmonary emboli.  He had a hazy right basilar

opacity that was peripheral and wedge-shaped, consistent with an infarct, but no

effusions.  Chest x-ray also done at that time showed vague right lower zone opacity as

well.  Labs were reviewed.  His creatinine was normal.  His BNP was 120.  He had 2

negative troponins.  Because of his weight, he was treated with IV heparin and started

on Coumadin and he was improving.  Yesterday, he became tachycardic and had some

low-grade fever, had Pulmonary consultation.  His x-ray was essentially unchanged.

However, he did have a bump in his troponins from previously normal to 1.32 and now it

is down to 0.66. 

 

PAST MEDICAL HISTORY:  Aside from morbid obesity, sleep apnea, otherwise negative.

 

PAST SURGICAL HISTORY:  None.

 

SOCIAL HISTORY:  Negative for tobacco, alcohol, or drug use.

 

FAMILY HISTORY:  Unremarkable.  Both his parents  in their 40s of nonheritable

diseases.  Mother had apparently an overdose and father was murdered. 

 

PHYSICAL EXAMINATION:

VITAL SIGNS:  He is afebrile currently, although his temperature last night was 100.7.

Prior to that, he had been afebrile.  Heart rate in the 80s to 90s currently, but last

night he did get tachycardic to around 130.  Blood pressures over the  and  have

been well controlled and no higher than 145 or 141 systolic.  He did have 150s and 160s

from the  through the  intermittently.  Oxygen saturation is anywhere between

91% and 100%.  Most recently 94%-95%. 

GENERAL APPEARANCE:  He is morbidly obese appearing white man awake, alert, sitting in

the edge of bed, not distressed, pleasant. 

HEENT:  Head is normocephalic, atraumatic.  Pupils are round and reactive.  Mucous

membranes moist. 

CHEST:  Clear to auscultation. 

HEART:  Regular rate and rhythm.  No murmurs, rubs, gallops.  ABDOMEN:  Obese,

nontender, soft. 

EXTREMITIES:  Have no cyanosis, clubbing.  There is some trace nonpitting edema. 

NEUROLOGICAL:  He is awake, alert, grossly nonfocal.

LABORATORY DATA:  Labs have been reviewed as described above.  Again, his imaging has

been reviewed as described above. 

 

ASSESSMENT:  

1. Pulmonary embolism with infarct.

2. Elevated troponin, possibly non ST-segment elevation myocardial infarction.  Could

his troponins just be coming down. 

3. Obstructive sleep apnea.

4. Morbid obesity.

5. Possible hypercoagulable state.

6. Possible small embolic stroke with visual field defect.

 

RECOMMENDATIONS AND PLAN:  We will continue anticoagulation, with bridging of the

Coumadin and a goal INR of 2 to 3.  Agree with aspirin, beta blocker.  Consultation with

Cardiology.  We will continue his CPAP.  Also repeat a urinalysis to see if he still has

hematuria.  Case was discussed with the attending physician. 

 

 

 

 

______________________________

JoseMD KARTHIK Calvo/JEMAL

D:  2020 12:38:03

T:  2020 19:55:03

Job #:  [id number]

## 2020-04-22 VITALS — DIASTOLIC BLOOD PRESSURE: 71 MMHG | SYSTOLIC BLOOD PRESSURE: 126 MMHG

## 2020-04-22 VITALS — SYSTOLIC BLOOD PRESSURE: 139 MMHG | DIASTOLIC BLOOD PRESSURE: 82 MMHG

## 2020-04-22 VITALS — DIASTOLIC BLOOD PRESSURE: 70 MMHG | SYSTOLIC BLOOD PRESSURE: 133 MMHG

## 2020-04-22 VITALS — DIASTOLIC BLOOD PRESSURE: 67 MMHG | SYSTOLIC BLOOD PRESSURE: 127 MMHG

## 2020-04-22 VITALS — SYSTOLIC BLOOD PRESSURE: 128 MMHG | DIASTOLIC BLOOD PRESSURE: 69 MMHG

## 2020-04-22 VITALS — DIASTOLIC BLOOD PRESSURE: 53 MMHG | SYSTOLIC BLOOD PRESSURE: 89 MMHG

## 2020-04-22 VITALS — SYSTOLIC BLOOD PRESSURE: 127 MMHG | DIASTOLIC BLOOD PRESSURE: 67 MMHG

## 2020-04-22 VITALS — DIASTOLIC BLOOD PRESSURE: 75 MMHG | SYSTOLIC BLOOD PRESSURE: 114 MMHG

## 2020-04-22 VITALS — DIASTOLIC BLOOD PRESSURE: 82 MMHG | SYSTOLIC BLOOD PRESSURE: 139 MMHG

## 2020-04-22 LAB
ANION GAP SERPL CALC-SCNC: 13 MMOL/L (ref 8–16)
ANISOCYTOSIS BLD QL SMEAR: SLIGHT
BACTERIA URNS QL MICRO: (no result) /HPF
BASOPHILS # BLD AUTO: 0.1 10*3/UL (ref 0–0.1)
BASOPHILS NFR BLD AUTO: 0.4 % (ref 0–1)
BILIRUB UR QL: NEGATIVE
BUN SERPL-MCNC: 22 MG/DL (ref 7–26)
BUN/CREAT SERPL: 15 (ref 6–25)
CALCIUM SERPL-MCNC: 9.2 MG/DL (ref 8.4–10.2)
CHLORIDE SERPL-SCNC: 101 MMOL/L (ref 98–107)
CLARITY UR: CLEAR
CO2 SERPL-SCNC: 26 MMOL/L (ref 22–29)
COLOR UR: YELLOW
DEPRECATED APTT PLAS QN: 90.9 SECONDS (ref 23.8–35.5)
DEPRECATED INR PLAS: 2.03
DEPRECATED NEUTROPHILS # BLD AUTO: 12.8 10*3/UL (ref 2.1–6.9)
DEPRECATED RBC URNS MANUAL-ACNC: (no result) /HPF (ref 0–5)
EGFRCR SERPLBLD CKD-EPI 2021: 51 ML/MIN (ref 60–?)
EOSINOPHIL # BLD AUTO: 0.1 10*3/UL (ref 0–0.4)
EOSINOPHIL NFR BLD AUTO: 0.4 % (ref 0–6)
EOSINOPHIL NFR BLD MANUAL: 2 % (ref 0–7)
EPI CELLS URNS QL MICRO: (no result) /LPF
ERYTHROCYTE [DISTWIDTH] IN CORD BLOOD: 14.6 % (ref 11.7–14.4)
FIBRINOGEN PPP-MCNC: 768 MG/DL (ref 193–507)
GLUCOSE SERPLBLD-MCNC: 128 MG/DL (ref 74–118)
HCT VFR BLD AUTO: 39.4 % (ref 38.2–49.6)
HGB BLD-MCNC: 12.9 G/DL (ref 14–18)
KETONES UR QL STRIP.AUTO: NEGATIVE
LEUKOCYTE ESTERASE UR QL STRIP.AUTO: NEGATIVE
LYMPHOCYTES # BLD: 1.5 10*3/UL (ref 1–3.2)
LYMPHOCYTES NFR BLD AUTO: 8.8 % (ref 18–39.1)
LYMPHOCYTES NFR BLD MANUAL: 7 % (ref 19–48)
MCH RBC QN AUTO: 29.1 PG (ref 28–32)
MCHC RBC AUTO-ENTMCNC: 32.7 G/DL (ref 31–35)
MCV RBC AUTO: 88.7 FL (ref 81–99)
MONOCYTES # BLD AUTO: 2 10*3/UL (ref 0.2–0.8)
MONOCYTES NFR BLD AUTO: 12.1 % (ref 4.4–11.3)
MONOCYTES NFR BLD MANUAL: 14 % (ref 3.4–9)
MUCOUS THREADS URNS QL MICRO: (no result)
NEUTS SEG NFR BLD AUTO: 77.4 % (ref 38.7–80)
NEUTS SEG NFR BLD MANUAL: 77 % (ref 40–74)
NITRITE UR QL STRIP.AUTO: NEGATIVE
PLAT MORPH BLD: (no result)
PLATELET # BLD AUTO: 143 X10E3/UL (ref 140–360)
PLATELET # BLD EST: ADEQUATE 10*3/UL
POTASSIUM SERPL-SCNC: 4 MMOL/L (ref 3.5–5.1)
PROT UR QL STRIP.AUTO: NEGATIVE
PROTHROMBIN TIME: 24.4 SECONDS (ref 11.9–14.5)
RBC # BLD AUTO: 4.44 X10E6/UL (ref 4.3–5.7)
RBC MORPH BLD: NORMAL
SODIUM SERPL-SCNC: 136 MMOL/L (ref 136–145)
SP GR UR STRIP: 1.02 (ref 1.01–1.02)
UROBILINOGEN UR STRIP-MCNC: 0.2 MG/DL (ref 0.2–1)

## 2020-04-22 RX ADMIN — Medication SCH MLS/HR: at 20:00

## 2020-04-22 RX ADMIN — Medication SCH MLS/HR: at 04:04

## 2020-04-22 RX ADMIN — ATORVASTATIN CALCIUM SCH MG: 20 TABLET, FILM COATED ORAL at 20:09

## 2020-04-22 RX ADMIN — Medication SCH MLS/HR: at 04:02

## 2020-04-22 RX ADMIN — METOPROLOL TARTRATE SCH MG: 25 TABLET, FILM COATED ORAL at 16:35

## 2020-04-22 RX ADMIN — DOCUSATE SODIUM SCH MG: 100 TABLET, FILM COATED ORAL at 09:16

## 2020-04-22 RX ADMIN — ASPIRIN 81 MG CHEWABLE TABLET SCH MG: 81 TABLET CHEWABLE at 09:16

## 2020-04-22 RX ADMIN — METOPROLOL TARTRATE SCH MG: 25 TABLET, FILM COATED ORAL at 09:16

## 2020-04-22 RX ADMIN — DOCUSATE SODIUM SCH MG: 100 TABLET, FILM COATED ORAL at 16:34

## 2020-04-23 VITALS — SYSTOLIC BLOOD PRESSURE: 141 MMHG | DIASTOLIC BLOOD PRESSURE: 91 MMHG

## 2020-04-23 VITALS — SYSTOLIC BLOOD PRESSURE: 131 MMHG | DIASTOLIC BLOOD PRESSURE: 66 MMHG

## 2020-04-23 VITALS — SYSTOLIC BLOOD PRESSURE: 111 MMHG | DIASTOLIC BLOOD PRESSURE: 59 MMHG

## 2020-04-23 VITALS — SYSTOLIC BLOOD PRESSURE: 138 MMHG | DIASTOLIC BLOOD PRESSURE: 79 MMHG

## 2020-04-23 VITALS — SYSTOLIC BLOOD PRESSURE: 102 MMHG | DIASTOLIC BLOOD PRESSURE: 91 MMHG

## 2020-04-23 VITALS — SYSTOLIC BLOOD PRESSURE: 124 MMHG | DIASTOLIC BLOOD PRESSURE: 80 MMHG

## 2020-04-23 LAB
ALBUMIN SERPL-MCNC: 2.5 G/DL (ref 3.5–5)
ALBUMIN/GLOB SERPL: 0.5 {RATIO} (ref 0.8–2)
ALP SERPL-CCNC: 110 IU/L (ref 40–150)
ALT SERPL-CCNC: 77 IU/L (ref 0–55)
ANION GAP SERPL CALC-SCNC: 10.8 MMOL/L (ref 8–16)
ANION GAP SERPL CALC-SCNC: 12 MMOL/L (ref 8–16)
ANISOCYTOSIS BLD QL SMEAR: SLIGHT
BASOPHILS # BLD AUTO: 0.1 10*3/UL (ref 0–0.1)
BASOPHILS NFR BLD AUTO: 0.4 % (ref 0–1)
BUN SERPL-MCNC: 21 MG/DL (ref 7–26)
BUN SERPL-MCNC: 21 MG/DL (ref 7–26)
BUN/CREAT SERPL: 17 (ref 6–25)
BUN/CREAT SERPL: 17 (ref 6–25)
CALCIUM SERPL-MCNC: 8.7 MG/DL (ref 8.4–10.2)
CALCIUM SERPL-MCNC: 8.9 MG/DL (ref 8.4–10.2)
CHLORIDE SERPL-SCNC: 101 MMOL/L (ref 98–107)
CHLORIDE SERPL-SCNC: 102 MMOL/L (ref 98–107)
CK MB SERPL-MCNC: 4 NG/ML (ref 0–5)
CK SERPL-CCNC: 95 IU/L (ref 30–200)
CO2 SERPL-SCNC: 25 MMOL/L (ref 22–29)
CO2 SERPL-SCNC: 27 MMOL/L (ref 22–29)
DEPRECATED APTT PLAS QN: 96.2 SECONDS (ref 23.8–35.5)
DEPRECATED FTI SERPL-MCNC: 2.6 UG/DL (ref 1.4–3.8)
DEPRECATED INR PLAS: 2.34
DEPRECATED INR PLAS: 2.37
DEPRECATED NEUTROPHILS # BLD AUTO: 10.6 10*3/UL (ref 2.1–6.9)
EGFRCR SERPLBLD CKD-EPI 2021: 60 ML/MIN (ref 60–?)
EGFRCR SERPLBLD CKD-EPI 2021: > 60 ML/MIN (ref 60–?)
EOSINOPHIL # BLD AUTO: 0.1 10*3/UL (ref 0–0.4)
EOSINOPHIL NFR BLD AUTO: 0.4 % (ref 0–6)
ERYTHROCYTE [DISTWIDTH] IN CORD BLOOD: 14.8 % (ref 11.7–14.4)
GLOBULIN PLAS-MCNC: 4.6 G/DL (ref 2.3–3.5)
GLUCOSE SERPLBLD-MCNC: 121 MG/DL (ref 74–118)
GLUCOSE SERPLBLD-MCNC: 125 MG/DL (ref 74–118)
HCT VFR BLD AUTO: 34.1 % (ref 38.2–49.6)
HGB BLD-MCNC: 11.1 G/DL (ref 14–18)
LYMPHOCYTES # BLD: 1.3 10*3/UL (ref 1–3.2)
LYMPHOCYTES NFR BLD AUTO: 9.7 % (ref 18–39.1)
LYMPHOCYTES NFR BLD MANUAL: 9 % (ref 19–48)
MCH RBC QN AUTO: 29 PG (ref 28–32)
MCHC RBC AUTO-ENTMCNC: 32.6 G/DL (ref 31–35)
MCV RBC AUTO: 89 FL (ref 81–99)
MONOCYTES # BLD AUTO: 1.6 10*3/UL (ref 0.2–0.8)
MONOCYTES NFR BLD AUTO: 11.3 % (ref 4.4–11.3)
MONOCYTES NFR BLD MANUAL: 13 % (ref 3.4–9)
NEUTS SEG NFR BLD AUTO: 77.3 % (ref 38.7–80)
NEUTS SEG NFR BLD MANUAL: 77 % (ref 40–74)
PLAT MORPH BLD: (no result)
PLATELET # BLD AUTO: 149 X10E3/UL (ref 140–360)
PLATELET # BLD EST: ADEQUATE 10*3/UL
POTASSIUM SERPL-SCNC: 3.8 MMOL/L (ref 3.5–5.1)
POTASSIUM SERPL-SCNC: 4 MMOL/L (ref 3.5–5.1)
PROTHROMBIN TIME: 27.4 SECONDS (ref 11.9–14.5)
PROTHROMBIN TIME: 27.7 SECONDS (ref 11.9–14.5)
RBC # BLD AUTO: 3.83 X10E6/UL (ref 4.3–5.7)
RBC MORPH BLD: NORMAL
SODIUM SERPL-SCNC: 134 MMOL/L (ref 136–145)
SODIUM SERPL-SCNC: 136 MMOL/L (ref 136–145)
TSH SERPL DL<=0.005 MIU/L-ACNC: 3.3 UIU/ML (ref 0.35–4.94)

## 2020-04-23 RX ADMIN — DOCUSATE SODIUM SCH MG: 100 TABLET, FILM COATED ORAL at 08:45

## 2020-04-23 RX ADMIN — METOPROLOL TARTRATE SCH MG: 25 TABLET, FILM COATED ORAL at 08:46

## 2020-04-23 RX ADMIN — ASPIRIN 81 MG CHEWABLE TABLET SCH MG: 81 TABLET CHEWABLE at 08:45

## 2020-04-23 RX ADMIN — METOPROLOL TARTRATE SCH MG: 25 TABLET, FILM COATED ORAL at 18:09

## 2020-04-23 RX ADMIN — Medication SCH MLS/HR: at 00:50

## 2020-04-23 RX ADMIN — ATORVASTATIN CALCIUM SCH MG: 20 TABLET, FILM COATED ORAL at 22:02

## 2020-04-23 RX ADMIN — DOCUSATE SODIUM SCH MG: 100 TABLET, FILM COATED ORAL at 17:00

## 2020-04-23 RX ADMIN — HYDROCODONE BITARTRATE AND ACETAMINOPHEN PRN EA: 10; 325 TABLET ORAL at 22:02

## 2020-04-24 VITALS — SYSTOLIC BLOOD PRESSURE: 135 MMHG | DIASTOLIC BLOOD PRESSURE: 80 MMHG

## 2020-04-24 VITALS — DIASTOLIC BLOOD PRESSURE: 80 MMHG | SYSTOLIC BLOOD PRESSURE: 119 MMHG

## 2020-04-24 VITALS — SYSTOLIC BLOOD PRESSURE: 118 MMHG | DIASTOLIC BLOOD PRESSURE: 71 MMHG

## 2020-04-24 VITALS — DIASTOLIC BLOOD PRESSURE: 81 MMHG | SYSTOLIC BLOOD PRESSURE: 125 MMHG

## 2020-04-24 VITALS — DIASTOLIC BLOOD PRESSURE: 80 MMHG | SYSTOLIC BLOOD PRESSURE: 132 MMHG

## 2020-04-24 LAB
DEPRECATED INR PLAS: 2.73
PROTHROMBIN TIME: 31 SECONDS (ref 11.9–14.5)

## 2020-04-24 RX ADMIN — METOPROLOL TARTRATE SCH MG: 25 TABLET, FILM COATED ORAL at 09:23

## 2020-04-24 RX ADMIN — DOCUSATE SODIUM SCH MG: 100 TABLET, FILM COATED ORAL at 09:00

## 2020-04-24 RX ADMIN — HYDROCODONE BITARTRATE AND ACETAMINOPHEN PRN EA: 10; 325 TABLET ORAL at 01:57

## 2020-04-24 RX ADMIN — ASPIRIN 81 MG CHEWABLE TABLET SCH MG: 81 TABLET CHEWABLE at 09:22

## 2020-04-24 NOTE — DISCHARGE SUMMARY
FINAL DIAGNOSIS:  Bilateral pulmonary embolism with pulmonary infarct.

 

SECONDARY DIAGNOSES:  

1. Bilateral lower extremity deep venous thrombosis.

2. Acute kidney injury, resolved.

3. Positive troponin due to bilateral pulmonary embolism.

4. Morbid obesity.

5. Per report recent stroke with visual deficits.

 

CONSULTANTS:  Dr. Jose Rosenbaum, Pulmonology; Dr. Lawton, Cardiology.

 

PROCEDURES/STUDIES PERFORMED:  

1. Echocardiogram, which was benign.

2. Chest CT.

3. Bilateral lower extremity venous Doppler.

 

HISTORY:  Per [facility].

 

HOSPITAL COURSE:  The patient was admitted with bilateral PE with pulmonary infarct.

Given his overweight, Lovenox and novel oral anticoagulation having been steady in his

weight.  Therefore, heparin drip was started.  Subsequently, Coumadin was started.

Currently, the patient's Coumadin is therapeutic.  I will start him now on 5 mg.  The

patient will follow up with his primary care doctor next week and check another INR.

While in the hospital, the patient bumped his troponin and his creatinine, subsequently

these are getting better.  The patient was evaluated by Cardiology.  His positive

troponin was thought to be due to his PE.  The patient was able to ambulate on room air

and did not meet criteria for home oxygen.  The patient understands that in 3 to 6

months when he is ready to come off Coumadin, that he will need a hematologic evaluation

for possible hypercoagulable state and also a transesophageal echocardiogram from [facility] for possible connection between the right and left side of the heart.  The

patient was seen and examined today.  It took 35 minutes total to discharge this

patient. 

 

CONDITION ON DISCHARGE:  Improved.

 

DISCHARGE MEDICATIONS:  Please see medication reconciliation form.

 

 

 

 

______________________________

MD KANCHAN Zaman/JEMAL

D:  04/24/2020 11:25:56

T:  04/24/2020 11:53:41

Job #:  [id number]

 

cc:            [facility]

## 2022-07-09 ENCOUNTER — HOSPITAL ENCOUNTER (INPATIENT)
Dept: HOSPITAL 88 - ER | Age: 52
LOS: 3 days | Discharge: HOME | DRG: 872 | End: 2022-07-12
Attending: INTERNAL MEDICINE | Admitting: INTERNAL MEDICINE
Payer: COMMERCIAL

## 2022-07-09 VITALS — WEIGHT: 315 LBS | BODY MASS INDEX: 42.66 KG/M2 | HEIGHT: 72 IN

## 2022-07-09 DIAGNOSIS — Z88.0: ICD-10-CM

## 2022-07-09 DIAGNOSIS — D50.9: ICD-10-CM

## 2022-07-09 DIAGNOSIS — Z86.711: ICD-10-CM

## 2022-07-09 DIAGNOSIS — N17.9: ICD-10-CM

## 2022-07-09 DIAGNOSIS — E66.01: ICD-10-CM

## 2022-07-09 DIAGNOSIS — N39.0: ICD-10-CM

## 2022-07-09 DIAGNOSIS — Z79.01: ICD-10-CM

## 2022-07-09 DIAGNOSIS — Z88.1: ICD-10-CM

## 2022-07-09 DIAGNOSIS — A41.59: Primary | ICD-10-CM

## 2022-07-09 DIAGNOSIS — I10: ICD-10-CM

## 2022-07-09 PROCEDURE — 85610 PROTHROMBIN TIME: CPT

## 2022-07-09 PROCEDURE — 82550 ASSAY OF CK (CPK): CPT

## 2022-07-09 PROCEDURE — 80053 COMPREHEN METABOLIC PANEL: CPT

## 2022-07-09 PROCEDURE — 36415 COLL VENOUS BLD VENIPUNCTURE: CPT

## 2022-07-09 PROCEDURE — 81001 URINALYSIS AUTO W/SCOPE: CPT

## 2022-07-09 PROCEDURE — 82553 CREATINE MB FRACTION: CPT

## 2022-07-09 PROCEDURE — 87040 BLOOD CULTURE FOR BACTERIA: CPT

## 2022-07-09 PROCEDURE — 87205 SMEAR GRAM STAIN: CPT

## 2022-07-09 PROCEDURE — 99284 EMERGENCY DEPT VISIT MOD MDM: CPT

## 2022-07-09 PROCEDURE — 94799 UNLISTED PULMONARY SVC/PX: CPT

## 2022-07-09 PROCEDURE — 71045 X-RAY EXAM CHEST 1 VIEW: CPT

## 2022-07-09 PROCEDURE — 80048 BASIC METABOLIC PNL TOTAL CA: CPT

## 2022-07-09 PROCEDURE — 84484 ASSAY OF TROPONIN QUANT: CPT

## 2022-07-09 PROCEDURE — 83605 ASSAY OF LACTIC ACID: CPT

## 2022-07-09 PROCEDURE — 83540 ASSAY OF IRON: CPT

## 2022-07-09 PROCEDURE — 84466 ASSAY OF TRANSFERRIN: CPT

## 2022-07-09 PROCEDURE — 85025 COMPLETE CBC W/AUTO DIFF WBC: CPT

## 2022-07-09 PROCEDURE — 87086 URINE CULTURE/COLONY COUNT: CPT

## 2022-07-09 PROCEDURE — 93005 ELECTROCARDIOGRAM TRACING: CPT

## 2022-07-09 PROCEDURE — 87071 CULTURE AEROBIC QUANT OTHER: CPT

## 2022-07-09 PROCEDURE — 87186 SC STD MICRODIL/AGAR DIL: CPT

## 2022-07-10 VITALS — DIASTOLIC BLOOD PRESSURE: 56 MMHG | SYSTOLIC BLOOD PRESSURE: 131 MMHG

## 2022-07-10 VITALS — DIASTOLIC BLOOD PRESSURE: 72 MMHG | SYSTOLIC BLOOD PRESSURE: 146 MMHG

## 2022-07-10 VITALS — SYSTOLIC BLOOD PRESSURE: 146 MMHG | DIASTOLIC BLOOD PRESSURE: 72 MMHG

## 2022-07-10 VITALS — SYSTOLIC BLOOD PRESSURE: 131 MMHG | DIASTOLIC BLOOD PRESSURE: 56 MMHG

## 2022-07-10 LAB
ALBUMIN SERPL-MCNC: 3.3 G/DL (ref 3.5–5)
ALBUMIN/GLOB SERPL: 0.8 {RATIO} (ref 0.8–2)
ALP SERPL-CCNC: 93 IU/L (ref 40–150)
ALT SERPL-CCNC: 22 IU/L (ref 0–55)
ANION GAP SERPL CALC-SCNC: 13.1 MMOL/L (ref 8–16)
ANION GAP SERPL CALC-SCNC: 17.2 MMOL/L (ref 8–16)
BACTERIA URNS QL MICRO: (no result) /HPF
BASOPHILS # BLD AUTO: 0 10*3/UL (ref 0–0.1)
BASOPHILS # BLD AUTO: 0 10*3/UL (ref 0–0.1)
BASOPHILS NFR BLD AUTO: 0.2 % (ref 0–1)
BASOPHILS NFR BLD AUTO: 0.3 % (ref 0–1)
BUN SERPL-MCNC: 16 MG/DL (ref 7–26)
BUN SERPL-MCNC: 20 MG/DL (ref 7–26)
BUN/CREAT SERPL: 14 (ref 6–25)
BUN/CREAT SERPL: 14 (ref 6–25)
CALCIUM SERPL-MCNC: 7.6 MG/DL (ref 8.4–10.2)
CALCIUM SERPL-MCNC: 9 MG/DL (ref 8.4–10.2)
CHLORIDE SERPL-SCNC: 101 MMOL/L (ref 98–107)
CHLORIDE SERPL-SCNC: 104 MMOL/L (ref 98–107)
CK MB SERPL-MCNC: 0.8 NG/ML (ref 0–5)
CK SERPL-CCNC: 69 IU/L (ref 30–200)
CLARITY UR: (no result)
CO2 SERPL-SCNC: 20 MMOL/L (ref 22–29)
CO2 SERPL-SCNC: 22 MMOL/L (ref 22–29)
COLOR UR: YELLOW
DEPRECATED INR PLAS: 1.07
DEPRECATED NEUTROPHILS # BLD AUTO: 14.4 10*3/UL (ref 2.1–6.9)
DEPRECATED NEUTROPHILS # BLD AUTO: 15.1 10*3/UL (ref 2.1–6.9)
DEPRECATED RBC URNS MANUAL-ACNC: >50 /HPF (ref 0–5)
EOSINOPHIL # BLD AUTO: 0 10*3/UL (ref 0–0.4)
EOSINOPHIL # BLD AUTO: 0 10*3/UL (ref 0–0.4)
EOSINOPHIL NFR BLD AUTO: 0 % (ref 0–6)
EOSINOPHIL NFR BLD AUTO: 0.1 % (ref 0–6)
EPI CELLS URNS QL MICRO: (no result) /LPF
ERYTHROCYTE [DISTWIDTH] IN CORD BLOOD: 15.1 % (ref 11.7–14.4)
ERYTHROCYTE [DISTWIDTH] IN CORD BLOOD: 15.6 % (ref 11.7–14.4)
GLOBULIN PLAS-MCNC: 4.4 G/DL (ref 2.3–3.5)
GLUCOSE SERPLBLD-MCNC: 130 MG/DL (ref 74–118)
GLUCOSE SERPLBLD-MCNC: 160 MG/DL (ref 74–118)
HCT VFR BLD AUTO: 35.6 % (ref 38.2–49.6)
HCT VFR BLD AUTO: 39.3 % (ref 38.2–49.6)
HGB BLD-MCNC: 11.3 G/DL (ref 14–18)
HGB BLD-MCNC: 12.7 G/DL (ref 14–18)
KETONES UR QL STRIP.AUTO: (no result)
LEUKOCYTE ESTERASE UR QL STRIP.AUTO: (no result)
LYMPHOCYTES # BLD: 0.3 10*3/UL (ref 1–3.2)
LYMPHOCYTES # BLD: 0.3 10*3/UL (ref 1–3.2)
LYMPHOCYTES NFR BLD AUTO: 1.8 % (ref 18–39.1)
LYMPHOCYTES NFR BLD AUTO: 1.9 % (ref 18–39.1)
MCH RBC QN AUTO: 29.2 PG (ref 28–32)
MCH RBC QN AUTO: 29.5 PG (ref 28–32)
MCHC RBC AUTO-ENTMCNC: 31.7 G/DL (ref 31–35)
MCHC RBC AUTO-ENTMCNC: 32.3 G/DL (ref 31–35)
MCV RBC AUTO: 91.2 FL (ref 81–99)
MCV RBC AUTO: 92 FL (ref 81–99)
MONOCYTES # BLD AUTO: 0.1 10*3/UL (ref 0.2–0.8)
MONOCYTES # BLD AUTO: 0.6 10*3/UL (ref 0.2–0.8)
MONOCYTES NFR BLD AUTO: 0.9 % (ref 4.4–11.3)
MONOCYTES NFR BLD AUTO: 4 % (ref 4.4–11.3)
NEUTS SEG NFR BLD AUTO: 93.2 % (ref 38.7–80)
NEUTS SEG NFR BLD AUTO: 96.4 % (ref 38.7–80)
NITRITE UR QL STRIP.AUTO: NEGATIVE
PLATELET # BLD AUTO: 175 X10E3/UL (ref 140–360)
PLATELET # BLD AUTO: 199 X10E3/UL (ref 140–360)
POTASSIUM SERPL-SCNC: 4.1 MMOL/L (ref 3.5–5.1)
POTASSIUM SERPL-SCNC: 4.2 MMOL/L (ref 3.5–5.1)
PROT UR QL STRIP.AUTO: (no result)
PROTHROMBIN TIME: 14.9 SECONDS (ref 11.9–14.5)
RBC # BLD AUTO: 3.87 X10E6/UL (ref 4.3–5.7)
RBC # BLD AUTO: 4.31 X10E6/UL (ref 4.3–5.7)
SODIUM SERPL-SCNC: 134 MMOL/L (ref 136–145)
SODIUM SERPL-SCNC: 135 MMOL/L (ref 136–145)
SP GR UR STRIP: 1.02 (ref 1.01–1.02)
UROBILINOGEN UR STRIP-MCNC: 0.2 MG/DL (ref 0.2–1)
WBC #/AREA URNS HPF: >50 /HPF (ref 0–5)

## 2022-07-10 RX ADMIN — MEROPENEM SCH MLS/HR: 1 INJECTION INTRAVENOUS at 09:30

## 2022-07-10 RX ADMIN — MEROPENEM SCH MLS/HR: 1 INJECTION INTRAVENOUS at 22:10

## 2022-07-10 RX ADMIN — Medication PRN MG: at 19:39

## 2022-07-10 RX ADMIN — Medication PRN MG: at 09:04

## 2022-07-10 RX ADMIN — MEROPENEM SCH MLS/HR: 1 INJECTION INTRAVENOUS at 15:10

## 2022-07-10 RX ADMIN — ENOXAPARIN SODIUM SCH MG: 40 INJECTION SUBCUTANEOUS at 19:38

## 2022-07-11 VITALS — SYSTOLIC BLOOD PRESSURE: 166 MMHG | DIASTOLIC BLOOD PRESSURE: 69 MMHG

## 2022-07-11 VITALS — SYSTOLIC BLOOD PRESSURE: 124 MMHG | DIASTOLIC BLOOD PRESSURE: 76 MMHG

## 2022-07-11 VITALS — DIASTOLIC BLOOD PRESSURE: 69 MMHG | SYSTOLIC BLOOD PRESSURE: 108 MMHG

## 2022-07-11 VITALS — SYSTOLIC BLOOD PRESSURE: 126 MMHG | DIASTOLIC BLOOD PRESSURE: 81 MMHG

## 2022-07-11 VITALS — SYSTOLIC BLOOD PRESSURE: 116 MMHG | DIASTOLIC BLOOD PRESSURE: 62 MMHG

## 2022-07-11 VITALS — SYSTOLIC BLOOD PRESSURE: 145 MMHG | DIASTOLIC BLOOD PRESSURE: 82 MMHG

## 2022-07-11 VITALS — SYSTOLIC BLOOD PRESSURE: 158 MMHG | DIASTOLIC BLOOD PRESSURE: 56 MMHG

## 2022-07-11 LAB
ALBUMIN SERPL-MCNC: 2.5 G/DL (ref 3.5–5)
ALBUMIN/GLOB SERPL: 0.6 {RATIO} (ref 0.8–2)
ALP SERPL-CCNC: 62 IU/L (ref 40–150)
ALT SERPL-CCNC: 39 IU/L (ref 0–55)
ANION GAP SERPL CALC-SCNC: 12.1 MMOL/L (ref 8–16)
BASOPHILS # BLD AUTO: 0 10*3/UL (ref 0–0.1)
BASOPHILS NFR BLD AUTO: 0.5 % (ref 0–1)
BUN SERPL-MCNC: 12 MG/DL (ref 7–26)
BUN/CREAT SERPL: 11 (ref 6–25)
CALCIUM SERPL-MCNC: 7.9 MG/DL (ref 8.4–10.2)
CHLORIDE SERPL-SCNC: 106 MMOL/L (ref 98–107)
CO2 SERPL-SCNC: 23 MMOL/L (ref 22–29)
DEPRECATED NEUTROPHILS # BLD AUTO: 4.5 10*3/UL (ref 2.1–6.9)
EOSINOPHIL # BLD AUTO: 0 10*3/UL (ref 0–0.4)
EOSINOPHIL NFR BLD AUTO: 0.3 % (ref 0–6)
ERYTHROCYTE [DISTWIDTH] IN CORD BLOOD: 15.6 % (ref 11.7–14.4)
GLOBULIN PLAS-MCNC: 3.9 G/DL (ref 2.3–3.5)
GLUCOSE SERPLBLD-MCNC: 113 MG/DL (ref 74–118)
HCT VFR BLD AUTO: 35.4 % (ref 38.2–49.6)
HGB BLD-MCNC: 11.1 G/DL (ref 14–18)
IRON SATN MFR SERPL: 12 % (ref 15–50)
IRON SERPL-MCNC: 32 UG/DL (ref 65–175)
LYMPHOCYTES # BLD: 0.5 10*3/UL (ref 1–3.2)
LYMPHOCYTES NFR BLD AUTO: 7.8 % (ref 18–39.1)
MCH RBC QN AUTO: 28.9 PG (ref 28–32)
MCHC RBC AUTO-ENTMCNC: 31.4 G/DL (ref 31–35)
MCV RBC AUTO: 92.2 FL (ref 81–99)
MONOCYTES # BLD AUTO: 0.7 10*3/UL (ref 0.2–0.8)
MONOCYTES NFR BLD AUTO: 12.7 % (ref 4.4–11.3)
NEUTS SEG NFR BLD AUTO: 78 % (ref 38.7–80)
PLATELET # BLD AUTO: 145 X10E3/UL (ref 140–360)
POTASSIUM SERPL-SCNC: 4.1 MMOL/L (ref 3.5–5.1)
RBC # BLD AUTO: 3.84 X10E6/UL (ref 4.3–5.7)
SODIUM SERPL-SCNC: 137 MMOL/L (ref 136–145)
TIBC SERPL-MCNC: 269 UG/DL (ref 261–478)
TRANSFERRIN SERPL-MCNC: 192 MG/DL (ref 174–364)

## 2022-07-11 RX ADMIN — Medication PRN MG: at 18:31

## 2022-07-11 RX ADMIN — SODIUM CHLORIDE SCH MLS/HR: 9 INJECTION, SOLUTION INTRAVENOUS at 12:05

## 2022-07-11 RX ADMIN — ENOXAPARIN SODIUM SCH MG: 40 INJECTION SUBCUTANEOUS at 20:20

## 2022-07-11 RX ADMIN — MEROPENEM SCH MLS/HR: 1 INJECTION INTRAVENOUS at 14:30

## 2022-07-11 RX ADMIN — MEROPENEM SCH MLS/HR: 1 INJECTION INTRAVENOUS at 05:55

## 2022-07-11 RX ADMIN — MEROPENEM SCH MLS/HR: 1 INJECTION INTRAVENOUS at 21:56

## 2022-07-11 RX ADMIN — ENOXAPARIN SODIUM SCH MG: 40 INJECTION SUBCUTANEOUS at 08:40

## 2022-07-11 RX ADMIN — VALSARTAN SCH MG: 160 TABLET ORAL at 11:39

## 2022-07-12 VITALS — DIASTOLIC BLOOD PRESSURE: 89 MMHG | SYSTOLIC BLOOD PRESSURE: 131 MMHG

## 2022-07-12 VITALS — SYSTOLIC BLOOD PRESSURE: 152 MMHG | DIASTOLIC BLOOD PRESSURE: 86 MMHG

## 2022-07-12 VITALS — DIASTOLIC BLOOD PRESSURE: 84 MMHG | SYSTOLIC BLOOD PRESSURE: 149 MMHG

## 2022-07-12 RX ADMIN — VALSARTAN SCH MG: 160 TABLET ORAL at 09:00

## 2022-07-12 RX ADMIN — SODIUM CHLORIDE SCH MLS/HR: 9 INJECTION, SOLUTION INTRAVENOUS at 11:13

## 2022-07-12 RX ADMIN — Medication PRN MG: at 05:25

## 2022-07-12 RX ADMIN — MEROPENEM SCH MLS/HR: 1 INJECTION INTRAVENOUS at 05:30

## 2022-07-12 RX ADMIN — ENOXAPARIN SODIUM SCH MG: 40 INJECTION SUBCUTANEOUS at 09:00

## 2022-08-31 ENCOUNTER — HOSPITAL ENCOUNTER (EMERGENCY)
Dept: HOSPITAL 88 - ER | Age: 52
Discharge: HOME | End: 2022-08-31
Payer: COMMERCIAL

## 2022-08-31 VITALS — BODY MASS INDEX: 36.45 KG/M2 | WEIGHT: 315 LBS | HEIGHT: 78 IN

## 2022-08-31 VITALS — SYSTOLIC BLOOD PRESSURE: 138 MMHG | DIASTOLIC BLOOD PRESSURE: 74 MMHG

## 2022-08-31 DIAGNOSIS — G47.33: ICD-10-CM

## 2022-08-31 DIAGNOSIS — N39.0: ICD-10-CM

## 2022-08-31 DIAGNOSIS — R30.0: Primary | ICD-10-CM

## 2022-08-31 DIAGNOSIS — Z86.73: ICD-10-CM

## 2022-08-31 DIAGNOSIS — Z87.442: ICD-10-CM

## 2022-08-31 LAB
ALBUMIN SERPL-MCNC: 3.3 G/DL (ref 3.5–5)
ALBUMIN/GLOB SERPL: 0.8 {RATIO} (ref 0.8–2)
ALP SERPL-CCNC: 77 IU/L (ref 40–150)
ALT SERPL-CCNC: 18 IU/L (ref 0–55)
ANION GAP SERPL CALC-SCNC: 16.2 MMOL/L (ref 8–16)
BACTERIA URNS QL MICRO: (no result) /HPF
BASOPHILS # BLD AUTO: 0 10*3/UL (ref 0–0.1)
BASOPHILS NFR BLD AUTO: 0.2 % (ref 0–1)
BUN SERPL-MCNC: 10 MG/DL (ref 7–26)
BUN/CREAT SERPL: 9 (ref 6–25)
CALCIUM SERPL-MCNC: 9.3 MG/DL (ref 8.4–10.2)
CHLORIDE SERPL-SCNC: 100 MMOL/L (ref 98–107)
CLARITY UR: (no result)
CO2 SERPL-SCNC: 22 MMOL/L (ref 22–29)
COLOR UR: YELLOW
DEPRECATED APTT PLAS QN: 28.2 SECONDS (ref 23.8–35.5)
DEPRECATED INR PLAS: 0.87
DEPRECATED NEUTROPHILS # BLD AUTO: 10.6 10*3/UL (ref 2.1–6.9)
DEPRECATED RBC URNS MANUAL-ACNC: (no result) /HPF (ref 0–5)
EOSINOPHIL # BLD AUTO: 0 10*3/UL (ref 0–0.4)
EOSINOPHIL NFR BLD AUTO: 0.1 % (ref 0–6)
EPI CELLS URNS QL MICRO: (no result) /LPF
ERYTHROCYTE [DISTWIDTH] IN CORD BLOOD: 15.6 % (ref 11.7–14.4)
GLOBULIN PLAS-MCNC: 4.2 G/DL (ref 2.3–3.5)
GLUCOSE SERPLBLD-MCNC: 141 MG/DL (ref 74–118)
HCT VFR BLD AUTO: 36 % (ref 38.2–49.6)
HGB BLD-MCNC: 12.4 G/DL (ref 14–18)
KETONES UR QL STRIP.AUTO: NEGATIVE
LEUKOCYTE ESTERASE UR QL STRIP.AUTO: (no result)
LYMPHOCYTES # BLD: 1 10*3/UL (ref 1–3.2)
LYMPHOCYTES NFR BLD AUTO: 7.8 % (ref 18–39.1)
MCH RBC QN AUTO: 29.8 PG (ref 28–32)
MCHC RBC AUTO-ENTMCNC: 34.4 G/DL (ref 31–35)
MCV RBC AUTO: 86.5 FL (ref 81–99)
MONOCYTES # BLD AUTO: 0.8 10*3/UL (ref 0.2–0.8)
MONOCYTES NFR BLD AUTO: 6 % (ref 4.4–11.3)
NEUTS SEG NFR BLD AUTO: 85.3 % (ref 38.7–80)
NITRITE UR QL STRIP.AUTO: POSITIVE
PLATELET # BLD AUTO: 241 X10E3/UL (ref 140–360)
POTASSIUM SERPL-SCNC: 4.2 MMOL/L (ref 3.5–5.1)
PROT UR QL STRIP.AUTO: (no result)
PROTHROMBIN TIME: 12.7 SECONDS (ref 11.9–14.5)
RBC # BLD AUTO: 4.16 X10E6/UL (ref 4.3–5.7)
SODIUM SERPL-SCNC: 134 MMOL/L (ref 136–145)
SP GR UR STRIP: 1.01 (ref 1.01–1.02)
UROBILINOGEN UR STRIP-MCNC: 0.2 MG/DL (ref 0.2–1)
WBC #/AREA URNS HPF: >50 /HPF (ref 0–5)

## 2022-08-31 PROCEDURE — 71045 X-RAY EXAM CHEST 1 VIEW: CPT

## 2022-08-31 PROCEDURE — 85025 COMPLETE CBC W/AUTO DIFF WBC: CPT

## 2022-08-31 PROCEDURE — 87186 SC STD MICRODIL/AGAR DIL: CPT

## 2022-08-31 PROCEDURE — 85610 PROTHROMBIN TIME: CPT

## 2022-08-31 PROCEDURE — 36415 COLL VENOUS BLD VENIPUNCTURE: CPT

## 2022-08-31 PROCEDURE — 80053 COMPREHEN METABOLIC PANEL: CPT

## 2022-08-31 PROCEDURE — 83605 ASSAY OF LACTIC ACID: CPT

## 2022-08-31 PROCEDURE — 87086 URINE CULTURE/COLONY COUNT: CPT

## 2022-08-31 PROCEDURE — 81001 URINALYSIS AUTO W/SCOPE: CPT

## 2022-08-31 PROCEDURE — 85730 THROMBOPLASTIN TIME PARTIAL: CPT

## 2022-08-31 PROCEDURE — 99284 EMERGENCY DEPT VISIT MOD MDM: CPT

## 2022-10-07 ENCOUNTER — HOSPITAL ENCOUNTER (INPATIENT)
Dept: HOSPITAL 88 - ER | Age: 52
LOS: 3 days | Discharge: HOME | DRG: 690 | End: 2022-10-10
Attending: INTERNAL MEDICINE | Admitting: INTERNAL MEDICINE
Payer: COMMERCIAL

## 2022-10-07 VITALS — DIASTOLIC BLOOD PRESSURE: 83 MMHG | SYSTOLIC BLOOD PRESSURE: 136 MMHG

## 2022-10-07 VITALS — HEIGHT: 72 IN | WEIGHT: 315 LBS | BODY MASS INDEX: 42.66 KG/M2

## 2022-10-07 VITALS — SYSTOLIC BLOOD PRESSURE: 136 MMHG | DIASTOLIC BLOOD PRESSURE: 83 MMHG

## 2022-10-07 DIAGNOSIS — Z16.24: ICD-10-CM

## 2022-10-07 DIAGNOSIS — I10: ICD-10-CM

## 2022-10-07 DIAGNOSIS — B96.1: ICD-10-CM

## 2022-10-07 DIAGNOSIS — Z79.01: ICD-10-CM

## 2022-10-07 DIAGNOSIS — E78.5: ICD-10-CM

## 2022-10-07 DIAGNOSIS — N39.0: Primary | ICD-10-CM

## 2022-10-07 DIAGNOSIS — Z87.440: ICD-10-CM

## 2022-10-07 DIAGNOSIS — D50.9: ICD-10-CM

## 2022-10-07 DIAGNOSIS — D69.6: ICD-10-CM

## 2022-10-07 DIAGNOSIS — E66.01: ICD-10-CM

## 2022-10-07 DIAGNOSIS — Z86.711: ICD-10-CM

## 2022-10-07 LAB
ALBUMIN SERPL-MCNC: 2.9 G/DL (ref 3.5–5)
ALBUMIN/GLOB SERPL: 0.7 {RATIO} (ref 0.8–2)
ALP SERPL-CCNC: 95 IU/L (ref 40–150)
ALT SERPL-CCNC: 64 IU/L (ref 0–55)
ANION GAP SERPL CALC-SCNC: 15.7 MMOL/L (ref 8–16)
BACTERIA URNS QL MICRO: (no result) /HPF
BASOPHILS # BLD AUTO: 0 10*3/UL (ref 0–0.1)
BASOPHILS NFR BLD AUTO: 0.2 % (ref 0–1)
BUN SERPL-MCNC: 11 MG/DL (ref 7–26)
BUN/CREAT SERPL: 10 (ref 6–25)
CALCIUM SERPL-MCNC: 9.2 MG/DL (ref 8.4–10.2)
CHLORIDE SERPL-SCNC: 103 MMOL/L (ref 98–107)
CLARITY UR: (no result)
CO2 SERPL-SCNC: 21 MMOL/L (ref 22–29)
COLOR UR: (no result)
DEPRECATED NEUTROPHILS # BLD AUTO: 11.9 10*3/UL (ref 2.1–6.9)
DEPRECATED RBC URNS MANUAL-ACNC: (no result) /HPF (ref 0–5)
EOSINOPHIL # BLD AUTO: 0 10*3/UL (ref 0–0.4)
EOSINOPHIL NFR BLD AUTO: 0.1 % (ref 0–6)
EPI CELLS URNS QL MICRO: (no result) /LPF
ERYTHROCYTE [DISTWIDTH] IN CORD BLOOD: 14.7 % (ref 11.7–14.4)
GLOBULIN PLAS-MCNC: 4 G/DL (ref 2.3–3.5)
GLUCOSE SERPLBLD-MCNC: 121 MG/DL (ref 74–118)
HCT VFR BLD AUTO: 37.2 % (ref 38.2–49.6)
HGB BLD-MCNC: 12 G/DL (ref 14–18)
KETONES UR QL STRIP.AUTO: (no result)
LEUKOCYTE ESTERASE UR QL STRIP.AUTO: (no result)
LYMPHOCYTES # BLD: 0.6 10*3/UL (ref 1–3.2)
LYMPHOCYTES NFR BLD AUTO: 4 % (ref 18–39.1)
MCH RBC QN AUTO: 29.8 PG (ref 28–32)
MCHC RBC AUTO-ENTMCNC: 32.3 G/DL (ref 31–35)
MCV RBC AUTO: 92.3 FL (ref 81–99)
MONOCYTES # BLD AUTO: 1.3 10*3/UL (ref 0.2–0.8)
MONOCYTES NFR BLD AUTO: 9.1 % (ref 4.4–11.3)
NEUTS SEG NFR BLD AUTO: 86 % (ref 38.7–80)
NITRITE UR QL STRIP.AUTO: POSITIVE
PLATELET # BLD AUTO: 169 X10E3/UL (ref 140–360)
POTASSIUM SERPL-SCNC: 3.7 MMOL/L (ref 3.5–5.1)
PROT UR QL STRIP.AUTO: (no result)
RBC # BLD AUTO: 4.03 X10E6/UL (ref 4.3–5.7)
SODIUM SERPL-SCNC: 136 MMOL/L (ref 136–145)
SP GR UR STRIP: 1.02 (ref 1.01–1.02)
UROBILINOGEN UR STRIP-MCNC: 1 MG/DL (ref 0.2–1)
WBC #/AREA URNS HPF: (no result) /HPF (ref 0–5)

## 2022-10-07 PROCEDURE — 0223U NFCT DS 22 TRGT SARS-COV-2: CPT

## 2022-10-07 PROCEDURE — 85025 COMPLETE CBC W/AUTO DIFF WBC: CPT

## 2022-10-07 PROCEDURE — 87186 SC STD MICRODIL/AGAR DIL: CPT

## 2022-10-07 PROCEDURE — 99284 EMERGENCY DEPT VISIT MOD MDM: CPT

## 2022-10-07 PROCEDURE — 84466 ASSAY OF TRANSFERRIN: CPT

## 2022-10-07 PROCEDURE — 80048 BASIC METABOLIC PNL TOTAL CA: CPT

## 2022-10-07 PROCEDURE — 82948 REAGENT STRIP/BLOOD GLUCOSE: CPT

## 2022-10-07 PROCEDURE — 83605 ASSAY OF LACTIC ACID: CPT

## 2022-10-07 PROCEDURE — 36415 COLL VENOUS BLD VENIPUNCTURE: CPT

## 2022-10-07 PROCEDURE — 87086 URINE CULTURE/COLONY COUNT: CPT

## 2022-10-07 PROCEDURE — 81001 URINALYSIS AUTO W/SCOPE: CPT

## 2022-10-07 PROCEDURE — 80053 COMPREHEN METABOLIC PANEL: CPT

## 2022-10-07 PROCEDURE — 83540 ASSAY OF IRON: CPT

## 2022-10-07 RX ADMIN — HYDROCODONE BITARTRATE AND ACETAMINOPHEN PRN EA: 5; 325 TABLET ORAL at 19:51

## 2022-10-07 RX ADMIN — MEROPENEM SCH MLS/HR: 1 INJECTION INTRAVENOUS at 20:01

## 2022-10-07 RX ADMIN — MEROPENEM SCH MLS/HR: 1 INJECTION INTRAVENOUS at 15:30

## 2022-10-07 RX ADMIN — Medication PRN ML: at 16:09

## 2022-10-07 RX ADMIN — Medication PRN ML: at 16:19

## 2022-10-08 VITALS — SYSTOLIC BLOOD PRESSURE: 142 MMHG | DIASTOLIC BLOOD PRESSURE: 79 MMHG

## 2022-10-08 VITALS — SYSTOLIC BLOOD PRESSURE: 136 MMHG | DIASTOLIC BLOOD PRESSURE: 69 MMHG

## 2022-10-08 VITALS — DIASTOLIC BLOOD PRESSURE: 64 MMHG | SYSTOLIC BLOOD PRESSURE: 115 MMHG

## 2022-10-08 VITALS — DIASTOLIC BLOOD PRESSURE: 79 MMHG | SYSTOLIC BLOOD PRESSURE: 153 MMHG

## 2022-10-08 VITALS — DIASTOLIC BLOOD PRESSURE: 83 MMHG | SYSTOLIC BLOOD PRESSURE: 136 MMHG

## 2022-10-08 LAB
ALBUMIN SERPL-MCNC: 2.4 G/DL (ref 3.5–5)
ALBUMIN/GLOB SERPL: 0.6 {RATIO} (ref 0.8–2)
ALP SERPL-CCNC: 86 IU/L (ref 40–150)
ALT SERPL-CCNC: 47 IU/L (ref 0–55)
ANION GAP SERPL CALC-SCNC: 13.6 MMOL/L (ref 8–16)
BASOPHILS # BLD AUTO: 0 10*3/UL (ref 0–0.1)
BASOPHILS NFR BLD AUTO: 0.3 % (ref 0–1)
BUN SERPL-MCNC: 11 MG/DL (ref 7–26)
BUN/CREAT SERPL: 12 (ref 6–25)
CALCIUM SERPL-MCNC: 8.5 MG/DL (ref 8.4–10.2)
CHLORIDE SERPL-SCNC: 105 MMOL/L (ref 98–107)
CO2 SERPL-SCNC: 22 MMOL/L (ref 22–29)
DEPRECATED NEUTROPHILS # BLD AUTO: 4.2 10*3/UL (ref 2.1–6.9)
EOSINOPHIL # BLD AUTO: 0.2 10*3/UL (ref 0–0.4)
EOSINOPHIL NFR BLD AUTO: 2.8 % (ref 0–6)
ERYTHROCYTE [DISTWIDTH] IN CORD BLOOD: 15.2 % (ref 11.7–14.4)
GLOBULIN PLAS-MCNC: 3.8 G/DL (ref 2.3–3.5)
GLUCOSE SERPLBLD-MCNC: 113 MG/DL (ref 74–118)
HCT VFR BLD AUTO: 31.2 % (ref 38.2–49.6)
HGB BLD-MCNC: 10.4 G/DL (ref 14–18)
LYMPHOCYTES # BLD: 0.8 10*3/UL (ref 1–3.2)
LYMPHOCYTES NFR BLD AUTO: 12.5 % (ref 18–39.1)
MCH RBC QN AUTO: 30.1 PG (ref 28–32)
MCHC RBC AUTO-ENTMCNC: 33.3 G/DL (ref 31–35)
MCV RBC AUTO: 90.2 FL (ref 81–99)
MONOCYTES # BLD AUTO: 1.2 10*3/UL (ref 0.2–0.8)
MONOCYTES NFR BLD AUTO: 18.2 % (ref 4.4–11.3)
NEUTS SEG NFR BLD AUTO: 65.7 % (ref 38.7–80)
PLATELET # BLD AUTO: 132 X10E3/UL (ref 140–360)
POTASSIUM SERPL-SCNC: 3.6 MMOL/L (ref 3.5–5.1)
RBC # BLD AUTO: 3.46 X10E6/UL (ref 4.3–5.7)
SODIUM SERPL-SCNC: 137 MMOL/L (ref 136–145)

## 2022-10-08 RX ADMIN — MEROPENEM SCH MLS/HR: 1 INJECTION INTRAVENOUS at 05:35

## 2022-10-08 RX ADMIN — Medication SCH MG: at 22:07

## 2022-10-08 RX ADMIN — Medication SCH MG: at 16:55

## 2022-10-08 RX ADMIN — VALSARTAN SCH MG: 160 TABLET ORAL at 16:56

## 2022-10-08 RX ADMIN — HYDROCODONE BITARTRATE AND ACETAMINOPHEN PRN EA: 5; 325 TABLET ORAL at 03:48

## 2022-10-08 RX ADMIN — MEROPENEM SCH MLS/HR: 1 INJECTION INTRAVENOUS at 14:00

## 2022-10-08 RX ADMIN — ENOXAPARIN SODIUM SCH MG: 40 INJECTION SUBCUTANEOUS at 16:56

## 2022-10-08 RX ADMIN — MEROPENEM SCH MLS/HR: 1 INJECTION INTRAVENOUS at 22:07

## 2022-10-09 VITALS — DIASTOLIC BLOOD PRESSURE: 68 MMHG | SYSTOLIC BLOOD PRESSURE: 127 MMHG

## 2022-10-09 VITALS — SYSTOLIC BLOOD PRESSURE: 152 MMHG | DIASTOLIC BLOOD PRESSURE: 78 MMHG

## 2022-10-09 VITALS — DIASTOLIC BLOOD PRESSURE: 71 MMHG | SYSTOLIC BLOOD PRESSURE: 133 MMHG

## 2022-10-09 VITALS — DIASTOLIC BLOOD PRESSURE: 75 MMHG | SYSTOLIC BLOOD PRESSURE: 133 MMHG

## 2022-10-09 VITALS — SYSTOLIC BLOOD PRESSURE: 159 MMHG | DIASTOLIC BLOOD PRESSURE: 68 MMHG

## 2022-10-09 VITALS — SYSTOLIC BLOOD PRESSURE: 126 MMHG | DIASTOLIC BLOOD PRESSURE: 67 MMHG

## 2022-10-09 LAB
ANION GAP SERPL CALC-SCNC: 16.8 MMOL/L (ref 8–16)
BASOPHILS # BLD AUTO: 0 10*3/UL (ref 0–0.1)
BASOPHILS NFR BLD AUTO: 0.6 % (ref 0–1)
BUN SERPL-MCNC: 14 MG/DL (ref 7–26)
BUN/CREAT SERPL: 13 (ref 6–25)
CALCIUM SERPL-MCNC: 8.9 MG/DL (ref 8.4–10.2)
CHLORIDE SERPL-SCNC: 102 MMOL/L (ref 98–107)
CO2 SERPL-SCNC: 22 MMOL/L (ref 22–29)
DEPRECATED NEUTROPHILS # BLD AUTO: 4 10*3/UL (ref 2.1–6.9)
EOSINOPHIL # BLD AUTO: 0.2 10*3/UL (ref 0–0.4)
EOSINOPHIL NFR BLD AUTO: 2.1 % (ref 0–6)
ERYTHROCYTE [DISTWIDTH] IN CORD BLOOD: 14.9 % (ref 11.7–14.4)
GLUCOSE SERPLBLD-MCNC: 115 MG/DL (ref 74–118)
HCT VFR BLD AUTO: 35.5 % (ref 38.2–49.6)
HGB BLD-MCNC: 12 G/DL (ref 14–18)
IRON SATN MFR SERPL: 26 % (ref 15–50)
IRON SERPL-MCNC: 70 UG/DL (ref 65–175)
LYMPHOCYTES # BLD: 1.6 10*3/UL (ref 1–3.2)
LYMPHOCYTES NFR BLD AUTO: 22.8 % (ref 18–39.1)
MCH RBC QN AUTO: 30 PG (ref 28–32)
MCHC RBC AUTO-ENTMCNC: 33.8 G/DL (ref 31–35)
MCV RBC AUTO: 88.8 FL (ref 81–99)
MONOCYTES # BLD AUTO: 1.2 10*3/UL (ref 0.2–0.8)
MONOCYTES NFR BLD AUTO: 17 % (ref 4.4–11.3)
NEUTS SEG NFR BLD AUTO: 56.6 % (ref 38.7–80)
PLATELET # BLD AUTO: 174 X10E3/UL (ref 140–360)
POTASSIUM SERPL-SCNC: 3.8 MMOL/L (ref 3.5–5.1)
RBC # BLD AUTO: 4 X10E6/UL (ref 4.3–5.7)
SODIUM SERPL-SCNC: 137 MMOL/L (ref 136–145)
TIBC SERPL-MCNC: 273 UG/DL (ref 261–478)
TRANSFERRIN SERPL-MCNC: 195 MG/DL (ref 174–364)

## 2022-10-09 RX ADMIN — MEROPENEM SCH MLS/HR: 1 INJECTION INTRAVENOUS at 21:55

## 2022-10-09 RX ADMIN — ASPIRIN 81 MG CHEWABLE TABLET SCH MG: 81 TABLET CHEWABLE at 08:43

## 2022-10-09 RX ADMIN — Medication SCH MG: at 08:43

## 2022-10-09 RX ADMIN — TAMSULOSIN HYDROCHLORIDE SCH MG: 0.4 CAPSULE ORAL at 08:44

## 2022-10-09 RX ADMIN — TAMSULOSIN HYDROCHLORIDE SCH MG: 0.4 CAPSULE ORAL at 09:00

## 2022-10-09 RX ADMIN — VALSARTAN SCH MG: 160 TABLET ORAL at 08:42

## 2022-10-09 RX ADMIN — MEROPENEM SCH MLS/HR: 1 INJECTION INTRAVENOUS at 14:23

## 2022-10-09 RX ADMIN — ENOXAPARIN SODIUM SCH MG: 40 INJECTION SUBCUTANEOUS at 17:02

## 2022-10-09 RX ADMIN — Medication SCH MG: at 21:55

## 2022-10-09 RX ADMIN — MEROPENEM SCH MLS/HR: 1 INJECTION INTRAVENOUS at 06:28

## 2022-10-10 VITALS — SYSTOLIC BLOOD PRESSURE: 126 MMHG | DIASTOLIC BLOOD PRESSURE: 68 MMHG

## 2022-10-10 VITALS — DIASTOLIC BLOOD PRESSURE: 61 MMHG | SYSTOLIC BLOOD PRESSURE: 125 MMHG

## 2022-10-10 VITALS — SYSTOLIC BLOOD PRESSURE: 125 MMHG | DIASTOLIC BLOOD PRESSURE: 61 MMHG

## 2022-10-10 VITALS — DIASTOLIC BLOOD PRESSURE: 62 MMHG | SYSTOLIC BLOOD PRESSURE: 113 MMHG

## 2022-10-10 VITALS — DIASTOLIC BLOOD PRESSURE: 52 MMHG | SYSTOLIC BLOOD PRESSURE: 127 MMHG

## 2022-10-10 RX ADMIN — VALSARTAN SCH MG: 160 TABLET ORAL at 09:24

## 2022-10-10 RX ADMIN — TAMSULOSIN HYDROCHLORIDE SCH MG: 0.4 CAPSULE ORAL at 09:23

## 2022-10-10 RX ADMIN — Medication SCH MG: at 09:22

## 2022-10-10 RX ADMIN — MEROPENEM SCH MLS/HR: 1 INJECTION INTRAVENOUS at 05:22

## 2022-10-10 RX ADMIN — ASPIRIN 81 MG CHEWABLE TABLET SCH MG: 81 TABLET CHEWABLE at 09:23
